# Patient Record
Sex: FEMALE | Race: WHITE | NOT HISPANIC OR LATINO | Employment: OTHER | URBAN - NONMETROPOLITAN AREA
[De-identification: names, ages, dates, MRNs, and addresses within clinical notes are randomized per-mention and may not be internally consistent; named-entity substitution may affect disease eponyms.]

---

## 2017-01-17 ENCOUNTER — TELEPHONE (OUTPATIENT)
Dept: MEDICAL GROUP | Facility: PHYSICIAN GROUP | Age: 77
End: 2017-01-17

## 2017-01-17 ENCOUNTER — TELEMEDICINE2 (OUTPATIENT)
Dept: MEDICAL GROUP | Facility: PHYSICIAN GROUP | Age: 77
End: 2017-01-17
Payer: MEDICARE

## 2017-01-17 ENCOUNTER — TELEMEDICINE ORIGINATING SITE VISIT (OUTPATIENT)
Dept: MEDICAL GROUP | Facility: CLINIC | Age: 77
End: 2017-01-17
Payer: MEDICARE

## 2017-01-17 VITALS
WEIGHT: 107 LBS | RESPIRATION RATE: 16 BRPM | SYSTOLIC BLOOD PRESSURE: 128 MMHG | TEMPERATURE: 96.3 F | HEART RATE: 76 BPM | DIASTOLIC BLOOD PRESSURE: 82 MMHG | OXYGEN SATURATION: 93 % | HEIGHT: 68 IN | BODY MASS INDEX: 16.22 KG/M2

## 2017-01-17 DIAGNOSIS — G47.00 INSOMNIA, UNSPECIFIED TYPE: ICD-10-CM

## 2017-01-17 DIAGNOSIS — G47.01 INSOMNIA DUE TO MEDICAL CONDITION: ICD-10-CM

## 2017-01-17 DIAGNOSIS — R52 BURNING PAIN: ICD-10-CM

## 2017-01-17 PROCEDURE — 99213 OFFICE O/P EST LOW 20 MIN: CPT | Mod: GT | Performed by: NURSE PRACTITIONER

## 2017-01-17 RX ORDER — TRAZODONE HYDROCHLORIDE 50 MG/1
100 TABLET ORAL
Qty: 60 TAB | Refills: 3 | Status: SHIPPED | OUTPATIENT
Start: 2017-01-17 | End: 2017-04-10

## 2017-01-17 RX ORDER — TRAMADOL HYDROCHLORIDE 50 MG/1
50 TABLET ORAL EVERY 4 HOURS PRN
COMMUNITY
End: 2017-04-10

## 2017-01-17 NOTE — MR AVS SNAPSHOT
"Kaleigh Tucker   2017 10:20 AM   Telemedicine2   MRN: [id number]    Department:  [facility]   Dept Phone:  [phone]    Description:  Female : 1940   Provider:  GENEVIEVE Philippe; TELEMED TONOPA           Reason for Visit     Insomnia           Allergies as of 2017     No Known Allergies      You were diagnosed with     Insomnia due to medical condition   [896088]       Burning pain   [407303]         Vital Signs     Blood Pressure Pulse Temperature Respirations Height Weight    128/82 mmHg 76 35.7 °C (96.3 °F) 16 1.727 m (5' 8\") 48.535 kg (107 lb)    Body Mass Index Oxygen Saturation Smoking Status             16.27 kg/m2 93% Never Smoker          Basic Information     Date Of Birth Sex Race Ethnicity Preferred Language    1940 Female White Non- English      Your appointments     Apr 10, 2017  9:00 AM   Follow Up Visit with Jose Landin M.D.   [facility] (--)    [address] [place] [address], [address]  [facility] [address] [phone]           You will be receiving a confirmation call a few days before your appointment from our automated call confirmation system.              Problem List              ICD-10-CM Priority Class Noted - Resolved    Kidney stone N20.0   2015 - Present    Parkinson's disease (CMS-HCC) G20   2015 - Present    Hyponatremia E87.1   2015 - Present    Burning pain R52   2016 - Present    Subcutaneous cyst L72.9   2016 - Present    Insomnia due to medical condition G47.01   2017 - Present      Health Maintenance        Date Due Completion Dates    IMM DTaP/Tdap/Td Vaccine (1 - Tdap) 10/22/1959 ---    PAP SMEAR 10/22/1961 ---    MAMMOGRAM 10/22/1980 ---    COLONOSCOPY 10/22/1990 ---    IMM ZOSTER VACCINE 10/22/2000 ---    BONE DENSITY 10/22/2005 ---    IMM PNEUMOCOCCAL 65+ (ADULT) LOW/MEDIUM RISK SERIES (1 of 2 - PCV13) 10/22/2005 ---    IMM INFLUENZA (1) 2016 ---            Current Immunizations    "    No immunizations on file.      Below and/or attached are the medications your provider expects you to take. Review all of your home medications and newly ordered medications with your provider and/or pharmacist. Follow medication instructions as directed by your provider and/or pharmacist. Please keep your medication list with you and share with your provider. Update the information when medications are discontinued, doses are changed, or new medications (including over-the-counter products) are added; and carry medication information at all times in the event of emergency situations     Allergies:  No Known Allergies          Medications  Valid as of: January 17, 2017 - 10:47 AM    Generic Name Brand Name Tablet Size Instructions for use    ALPRAZolam (Tab) XANAX 0.25 MG Take 0.25 mg by mouth at bedtime as needed for Sleep.        Carbidopa-Levodopa (Tab) SINEMET  MG Take 2 tab at 6AM, 12PM and 6PM, and 1 tab at 9AM and 3PM        Cholecalciferol   Take  by mouth.        Gabapentin (Cap) NEURONTIN 100 MG Take one capsule at night.        ROPINIRole HCl (Tab) REQUIP 1 MG Take 1 Tab by mouth 3 times a day. @ 0600, 1400, and 1800        ROPINIRole HCl (Tab) REQUIP 1 MG Take 1 Tab by mouth 3 times a day. @ 0600, 1400, and 1800        TraMADol HCl   Take  by mouth.        TraMADol HCl (Tab) ULTRAM 50 MG Take 50 mg by mouth every four hours as needed. Take one tablet by mouth twice daily as needed for pain        Trihexyphenidyl HCl (Tab) ARTANE 2 MG Take 1 Tab by mouth 4 times a day. @ 6AM, 9AM, 12PM and 3PM        .                 Medicines prescribed today were sent to:     [id number] - [place], [place] - [address]    [address] [address]    Phone: [phone] Fax: [phone]    Open 24 Hours?: No      Medication refill instructions:       If your prescription bottle indicates you have medication refills left, it is not necessary to call your provider’s office. Please contact  your pharmacy and they will refill your medication.    If your prescription bottle indicates you do not have any refills left, you may request refills at any time through one of the following ways: The online [employer] system (except Urgent Care), by calling your provider’s office, or by asking your pharmacy to contact your provider’s office with a refill request. Medication refills are processed only during regular business hours and may not be available until the next business day. Your provider may request additional information or to have a follow-up visit with you prior to refilling your medication.   *Please Note: Medication refills are assigned a new Rx number when refilled electronically. Your pharmacy may indicate that no refills were authorized even though a new prescription for the same medication is available at the pharmacy. Please request the medicine by name with the pharmacy before contacting your provider for a refill.           [employer] Access Code: Activation code not generated  Current [employer] Status: Active

## 2017-01-17 NOTE — PROGRESS NOTES
Chief Complaint   Patient presents with   • Insomnia       HISTORY OF PRESENT ILLNESS: Patient is a 76 y.o. female BRIELLE , patient, who presents today to discuss:  Insomnia, burning pain and review her labs.  Verified Identification with patient.  Secured video conference with RN presenter in [place], [place]        Insomnia due to medical condition  Patient reports a year of insomnia. Patient states that she normally takes Xanax for sleep, but is no longer helping. Patient has tried. Lavendar. Melatonin and over-the-counter diphenyl hydramine. She continues to have poor sleep and is not feeling restored in the morning. I would like to try her on trazodone at night, but will again get email to Dr. Noble for opinion.    Burning pain  This is a 76-year-old female who complains of chronic burning pain. This pain is so bad that when she has anything covering her body whether it is a blanket or if she or clothing it causes her severe pain. We attempted to try her on gabapentin which was basically successful but caused her to have severe depression and I discontinued it. I would like to pursue this burning pain and possibly start her on Cymbalta but will notify her neurologist if this is at all possible considering she had such a bad outcome with Neurontin.        Allergies:Review of patient's allergies indicates no known allergies.    Current Outpatient Prescriptions Ordered in [facility]   Medication Sig Dispense Refill   • tramadol (ULTRAM) 50 MG Tab Take 50 mg by mouth every four hours as needed. Take one tablet by mouth twice daily as needed for pain     • ropinirole (REQUIP) 1 MG Tab Take 1 Tab by mouth 3 times a day. @ 0600, 1400, and 1800 (Patient taking differently: Take 1 mg by mouth 3 times a day. @ 0600, 1200, and 1800) 270 Tab 3   • ropinirole (REQUIP) 1 MG Tab Take 1 Tab by mouth 3 times a day. @ 0600, 1400, and 1800 270 Tab 3   • carbidopa-levodopa (SINEMET)  MG Tab Take 2 tab at 6AM, 12PM and 6PM, and 1  "tab at 9AM and 3PM (Patient taking differently: Take two tablets orally at 6am, 12pm, and 6pm. Take one tablet at 9am and 3pm.) 720 Tab 3   • trihexyphenidyl (ARTANE) 2 MG Tab Take 1 Tab by mouth 4 times a day. @ 6AM, 9AM, 12PM and 3PM 360 Tab 3   • gabapentin (NEURONTIN) 100 MG Cap Take one capsule at night. 90 Cap 0   • TRAMADOL HCL PO Take  by mouth.     • alprazolam (XANAX) 0.25 MG Tab Take 0.25 mg by mouth at bedtime as needed for Sleep.     • Cholecalciferol (VITAMIN D-3 PO) Take  by mouth.       No current Epic-ordered facility-administered medications on file.       Past Medical History   Diagnosis Date   • Cancer (CMS-HCC)      melanoma on back, surgically removed in . Basal cell on scalp removed    • Muscle disorder      Parkinsons   • Parkinson's disease (CMS-HCC)    • Kidney stone 2015       Social History   Substance Use Topics   • Smoking status: Never Smoker    • Smokeless tobacco: Never Used   • Alcohol Use: No       Family Status   Relation Status Death Age   • Mother     • Father     • Sister Alive    • Brother     • Maternal Aunt     • Maternal Grandmother     • Maternal Grandfather     • Paternal Grandmother     • Paternal Grandfather       Family History   Problem Relation Age of Onset   • Heart Disease Mother      Heart attack/   • Hypertension Mother    • Hyperlipidemia Sister    • Heart Disease Brother    • Cancer Maternal Aunt      stomach cancer/   • Hypertension Maternal Aunt    • Hypertension Maternal Uncle    • Heart Disease Maternal Grandmother    • Cancer Maternal Grandfather      throat cancer       ROS: As documented in my HPI      Exam:  Blood pressure 128/82, pulse 76, temperature 35.7 °C (96.3 °F), resp. rate 16, height 1.727 m (5' 8\"), weight 48.535 kg (107 lb), SpO2 93 %.  General: Thin, pale and wheelchair with shaking upper extremities  Head: No lesions  Neck: Supple.  Pulmonary:  " Normal effort.   Cardiovascular: Regular rate   Psych: Alert and oriented x3. Normal mood and affect.       Please note that this dictation was created using voice recognition software. I have made every reasonable attempt to correct obvious errors, but I expect that there are errors of grammar and possibly content that I did not discover before finalizing the note.    Assessment/Plan:  1. Insomnia due to medical condition   my preference is to start her on trazodone 50 mg or more at night but will consult with her neurologist.    2. Burning pain   patient failed on Neurontin due to psychological symptoms would like to consider Cymbalta for her burning neuropathy pain will consult neurology. ,     Labs reviewed within normal limits.

## 2017-01-17 NOTE — ASSESSMENT & PLAN NOTE
Patient reports a year of insomnia. Patient states that she normally takes Xanax for sleep, but is no longer helping. Patient has tried. Lavendar. Melatonin and over-the-counter diphenyl hydramine. She continues to have poor sleep and is not feeling restored in the morning. I would like to try her on trazodone at night, but will again get email to Dr. Noble for opinion.

## 2017-01-17 NOTE — ASSESSMENT & PLAN NOTE
This is a 76-year-old female who complains of chronic burning pain. This pain is so bad that when she has anything covering her body whether it is a blanket or if she or clothing it causes her severe pain. We attempted to try her on gabapentin which was basically successful but caused her to have severe depression and I discontinued it. I would like to pursue this burning pain and possibly start her on Cymbalta but will notify her neurologist if this is at all possible considering she had such a bad outcome with Neurontin.

## 2017-01-18 NOTE — TELEPHONE ENCOUNTER
Received email from Dr. Landin. I will start patient on Trazadone 50 mg one at night. Once she is accustomed to the medication with no side effects I will then introduce Cymbalta. Please let her know that I am going to call in one prescription of trazodone 50 mg she can take one or 2 at night for sleep.

## 2017-01-21 ENCOUNTER — PATIENT MESSAGE (OUTPATIENT)
Dept: MEDICAL GROUP | Facility: PHYSICIAN GROUP | Age: 77
End: 2017-01-21

## 2017-01-24 ENCOUNTER — PATIENT MESSAGE (OUTPATIENT)
Dept: MEDICAL GROUP | Facility: PHYSICIAN GROUP | Age: 77
End: 2017-01-24

## 2017-01-24 RX ORDER — DULOXETIN HYDROCHLORIDE 20 MG/1
20 CAPSULE, DELAYED RELEASE ORAL DAILY
Qty: 30 CAP | Refills: 3 | Status: SHIPPED | OUTPATIENT
Start: 2017-01-24 | End: 2017-04-10

## 2017-01-25 NOTE — TELEPHONE ENCOUNTER
From: Kaleigh Sims  To: GENEVIEVE Philippe  Sent: 1/24/2017 4:13 PM PST  Subject: Prescription Question    I just read about it. How does the FDA approve anything with such side effects? I'm curious about the fact that I already have some of the side effects, such as blurry vision.   Will you call it in today?    ----- Message -----  From: GENEVIEVE Philippe  Sent: 1/24/17, 3:30 PM  To: Kaleigh Sims  Subject: RE: Prescription Question    Okay then. I can prescribe Cymbalta for your burning pain.   Jackie Villalba      ----- Message -----   From: KALEIGH SIMS   Sent: 1/21/2017 3:14 PM PST   To: GENEVIEVE Philippe  Subject: Prescription Question    Jackie, because of my experience with gabapentn, I'm not going to take the trazadone. The patient instructions didn't etate what the med was for so I looked it up. Off label for insomnia, with side effects of blurry vision and burning skin. I already have those problems so taking a med that could give me more is not ideal.  Is there another med you were going to prescribe for the burning skin? If I don't have the burning skin, I can sleep.   Thank you!  Kaleigh

## 2017-01-26 ENCOUNTER — TELEPHONE (OUTPATIENT)
Dept: NEUROLOGY | Facility: MEDICAL CENTER | Age: 77
End: 2017-01-26

## 2017-01-26 ENCOUNTER — PATIENT MESSAGE (OUTPATIENT)
Dept: MEDICAL GROUP | Facility: PHYSICIAN GROUP | Age: 77
End: 2017-01-26

## 2017-01-26 NOTE — TELEPHONE ENCOUNTER
From: Kaleigh Sims  To: GENEVIEVE Philippe  Sent: 1/26/2017 9:31 AM PST  Subject: Prescription Question    One night doesn't predict the future but it does provide hope. I took a Cymbalta at 4pm. I took my usual 1 tramadol and 2 0.25 Xanax at 9pm. I settled in bed and the next thing I noticed was that I had to go to the bathroom. It was 3:30am -- that's the longest uninterrupted stretch of sleep in more than a month. When I did get up, I had no burning skin. I am still free of pain at 9:30am. I am a bit wobbly but I think my body will adjust to that. I just wanted to share the situation.   Thank you  ----- Message -----  From: GENEVIEVE Philippe  Sent: 1/24/2017 4:15 PM PST  To: Kaleigh Sims  Subject: RE: Prescription Question    It is hard to imagine that these medications are helpful with all the side effects.  I am out of ideas for medications.  Cymbalta has been a god send for people with neuropathy that feels like hot lava on their skin.  You let me know. I already called it in.  Jackie Villalba      ----- Message -----   From: KALEIGH SIMS   Sent: 1/24/2017 4:13 PM PST   To: GENEVIEVE Philippe  Subject: Prescription Question    I just read about it. How does the FDA approve anything with such side effects? I'm curious about the fact that I already have some of the side effects, such as blurry vision.   Will you call it in today?    ----- Message -----  From: GENEVIEVE Philippe  Sent: 1/24/17, 3:30 PM  To: Kaleigh Sims  Subject: RE: Prescription Question    Okay then. I can prescribe Cymbalta for your burning pain.   Jackie Villalba      ----- Message -----   From: KALEIGH SIMS   Sent: 1/21/2017 3:14 PM PST   To: GENEVIEVE Philippe  Subject: Prescription Question    Jackie, because of my experience with gabapentn, I'm not going to take the trazadone. The patient instructions didn't etate what the med was for so I looked it up. Off label for insomnia, with side effects of blurry vision and burning skin. I already  have those problems so taking a med that could give me more is not ideal.  Is there another med you were going to prescribe for the burning skin? If I don't have the burning skin, I can sleep.   Thank you!  Kaleigh

## 2017-01-26 NOTE — TELEPHONE ENCOUNTER
Pt is sending this message via [employer] e-mail. Please advise. Thank you. KA    An update. Since the cold weather, I have averaged about 4 hours of interrupted sleep, mainly due to the burning skin sensation. Jackie Villalba, via teleconferencing between Maritza and Zeny Montano, prescribed trazadone. Because of my experience with gabapentin, I didn't want to take it. She prescribed 20mg Cymbalta. I took one at 4pm and my usual night meds.I slept 6 hours! My skin is not burning today, but balance is not good. I will keep you posted.

## 2017-02-01 ENCOUNTER — PATIENT MESSAGE (OUTPATIENT)
Dept: NEUROLOGY | Facility: MEDICAL CENTER | Age: 77
End: 2017-02-01

## 2017-02-01 ENCOUNTER — PATIENT MESSAGE (OUTPATIENT)
Dept: MEDICAL GROUP | Facility: PHYSICIAN GROUP | Age: 77
End: 2017-02-01

## 2017-02-01 NOTE — TELEPHONE ENCOUNTER
From: Steve Sims  To: Deepa Dobson, Med Ass't  Sent: 2/1/2017 9:35 AM PST  Subject: Prescription Question    Deepa, thank you! I can wait until I see Dr Landin in April. I will need the prescription only if I am audited.   I hope you are doing well.  Steve  ----- Message -----  From: Deepa SILVIAKamille Bronson, Med Ass't  Sent: 2/1/2017 9:14 AM PST  To: Steve Sims  Subject: RE: Prescription Question    Good morning Steve. You are more than welcome to ask Dr. Landin at your appointment for the Rx. You can also ask your PCP and see if she can give it to you sooner. Dr. Landin is out of the office for a couple more weeks. Thank you. Deepa MCDOWELL for Dr. Landin.     ----- Message -----   From: STEVE SIMS   Sent: 2/1/2017 9:09 AM PST   To: Jose Landin M.D.  Subject: Prescription Question    Deepa, sort of off-topic. My physical therapist recommended safety rails in my house. I had them installed. I found out yesterday that I can deduct the cost if I have a written prescription from my doctor. Can I get such? I have an appointment with Dr Landin on April 10, so I could get it then, if so. Thank you, Steve  PS: The Cymbalta allows me to get enough sleep! Yeah!

## 2017-02-01 NOTE — TELEPHONE ENCOUNTER
From: Kaleigh Sims  To: GENEVIEVE Philippe  Sent: 2/1/2017 9:02 AM PST  Subject: Prescription Question    The Cymbalta is allowing me to sleep. The burning skin is with me during the day, e.g., right now. But sleep is more important. I read that the med interacts with tramadol, but that some doctors prescribe both meds.To be on the safe side, I am not taking the tramadol at night time. Your opinion? What about an NSAID? Aleve? I hate taking meds of any kind but I decided I dislike pain.  Kaleigh  ----- Message -----  From: GENEVIEVE Philippe  Sent: 1/26/2017 9:59 AM PST  To: Kaleigh Sims  Subject: RE: Prescription Question    I am happy you had a good stretch of sleep. Let's see how it goes !  Jackie    ----- Message -----   From: KALEIGH SIMS   Sent: 1/26/2017 9:31 AM PST   To: GENEVIEVE Philippe  Subject: Prescription Question    One night doesn't predict the future but it does provide hope. I took a Cymbalta at 4pm. I took my usual 1 tramadol and 2 0.25 Xanax at 9pm. I settled in bed and the next thing I noticed was that I had to go to the bathroom. It was 3:30am -- that's the longest uninterrupted stretch of sleep in more than a month. When I did get up, I had no burning skin. I am still free of pain at 9:30am. I am a bit wobbly but I think my body will adjust to that. I just wanted to share the situation.   Thank you  ----- Message -----  From: GENEVIEVE Philippe  Sent: 1/24/2017 4:15 PM PST  To: Kaleigh Sims  Subject: RE: Prescription Question    It is hard to imagine that these medications are helpful with all the side effects.  I am out of ideas for medications.  Cymbalta has been a god send for people with neuropathy that feels like hot lava on their skin.  You let me know. I already called it in.  Jackie Villalba      ----- Message -----   From: KALEIGH SIMS   Sent: 1/24/2017 4:13 PM PST   To: GENEVIEVE Philippe  Subject: Prescription Question    I just read about it. How does the FDA approve anything with such  side effects? I'm curious about the fact that I already have some of the side effects, such as blurry vision.   Will you call it in today?    ----- Message -----  From: GENEVIEVE Philippe  Sent: 1/24/17, 3:30 PM  To: Kaleigh Sims  Subject: RE: Prescription Question    Okay then. I can prescribe Cymbalta for your burning pain.   Jackie Villalba      ----- Message -----   From: KALEIGH SIMS   Sent: 1/21/2017 3:14 PM PST   To: GENEVIEVE Philippe  Subject: Prescription Question    Jackie, because of my experience with gabapentn, I'm not going to take the trazadone. The patient instructions didn't etate what the med was for so I looked it up. Off label for insomnia, with side effects of blurry vision and burning skin. I already have those problems so taking a med that could give me more is not ideal.  Is there another med you were going to prescribe for the burning skin? If I don't have the burning skin, I can sleep.   Thank you!  Kaleigh

## 2017-02-08 ENCOUNTER — PATIENT MESSAGE (OUTPATIENT)
Dept: MEDICAL GROUP | Facility: PHYSICIAN GROUP | Age: 77
End: 2017-02-08

## 2017-02-08 DIAGNOSIS — M25.569 KNEE PAIN, UNSPECIFIED CHRONICITY, UNSPECIFIED LATERALITY: ICD-10-CM

## 2017-02-08 DIAGNOSIS — Z96.659 HISTORY OF KNEE REPLACEMENT, UNSPECIFIED LATERALITY: ICD-10-CM

## 2017-02-08 DIAGNOSIS — G20.A1 PARKINSON'S DISEASE: ICD-10-CM

## 2017-02-08 NOTE — TELEPHONE ENCOUNTER
From: Kaleigh Sims  To: GENEVIEVE Philippe  Sent: 2/8/2017 10:15 AM PST  Subject: Prescription Question    Problems with Duloxetine (Cymbalta)     VERY BAD negative side effects(?) occur in the evenings, after 4pm when I take the Duloxetine. Unable to do anything — extreme weakness, negate effects of PD meds I take a 2:30pm and 5:30pm; legs are extremely weak; “shiver” but not cold when in bed. Wake during the night; headache (I never get them); discomfort but no burning skin.  No get up and go. Don’t feel like working on my projects. Thinking is muddy. Blurry vision from PD meds seems worse  ADVICE?  ----- Message -----  From: GENEVIEVE Philippe  Sent: 2/1/2017 9:44 AM PST  To: Kaleigh Sims  Subject: RE: Prescription Question    This depends on the timing. I do prescribed both medications.  Quality of life is the important issue.  Watch for problems with balance and urine retention - as general rule.  Taking NSAID would be okay if you would like to replace.  Jackie Villalba      ----- Message -----   From: KALEIGH SIMS   Sent: 2/1/2017 9:02 AM PST   To: GENEVIEVE Philippe  Subject: Prescription Question    The Cymbalta is allowing me to sleep. The burning skin is with me during the day, e.g., right now. But sleep is more important. I read that the med interacts with tramadol, but that some doctors prescribe both meds.To be on the safe side, I am not taking the tramadol at night time. Your opinion? What about an NSAID? Aleve? I hate taking meds of any kind but I decided I dislike pain.  Kaleigh  ----- Message -----  From: GENEVIEVE Philippe  Sent: 1/26/2017 9:59 AM PST  To: Kaleigh Sims  Subject: RE: Prescription Question    I am happy you had a good stretch of sleep. Let's see how it goes !  Jackie    ----- Message -----   From: KALEIGH SIMS   Sent: 1/26/2017 9:31 AM PST   To: GENEVIEVE Philippe  Subject: Prescription Question    One night doesn't predict the future but it does provide hope. I took a Cymbalta at 4pm. I  took my usual 1 tramadol and 2 0.25 Xanax at 9pm. I settled in bed and the next thing I noticed was that I had to go to the bathroom. It was 3:30am -- that's the longest uninterrupted stretch of sleep in more than a month. When I did get up, I had no burning skin. I am still free of pain at 9:30am. I am a bit wobbly but I think my body will adjust to that. I just wanted to share the situation.   Thank you  ----- Message -----  From: GENEVIEVE Philippe  Sent: 1/24/2017 4:15 PM PST  To: Kaleigh Sims  Subject: RE: Prescription Question    It is hard to imagine that these medications are helpful with all the side effects.  I am out of ideas for medications.  Cymbalta has been a god send for people with neuropathy that feels like hot lava on their skin.  You let me know. I already called it in.  Jackie Villalba      ----- Message -----   From: KALEIGH SIMS   Sent: 1/24/2017 4:13 PM PST   To: GENEVIEVE Philippe  Subject: Prescription Question    I just read about it. How does the FDA approve anything with such side effects? I'm curious about the fact that I already have some of the side effects, such as blurry vision.   Will you call it in today?    ----- Message -----  From: GENEVIEVE Philippe  Sent: 1/24/17, 3:30 PM  To: Kaleigh Sims  Subject: RE: Prescription Question    Okay then. I can prescribe Cymbalta for your burning pain.   Jackie Villalba      ----- Message -----   From: KALEIGH SIMS   Sent: 1/21/2017 3:14 PM PST   To: GENEVIEVE Philippe  Subject: Prescription Question    Jackie, because of my experience with gabapentn, I'm not going to take the trazadone. The patient instructions didn't etate what the med was for so I looked it up. Off label for insomnia, with side effects of blurry vision and burning skin. I already have those problems so taking a med that could give me more is not ideal.  Is there another med you were going to prescribe for the burning skin? If I don't have the burning skin, I can sleep.   Thank  you!  Kaleigh

## 2017-02-08 NOTE — TELEPHONE ENCOUNTER
From: Kaleigh Sims  To: GENEVIEVE Philippe  Sent: 2/8/2017 12:09 PM PST  Subject: Prescription Question    By tapering the Cymbalta, do you mean to take it every other day? It's a capsule that I can't break. Thank you!    ----- Message -----  From: GENEVIEVE Philippe  Sent: 2/8/17, 11:48 AM  To: Kaleigh Sims  Subject: RE: Prescription Question    No sure these are side effects as it is delayed. Or is it something else.  You can taper the cymbalta and see if it goes away. If it does, there is your answer, if not, then it is something else.  Jackie Villalba      ----- Message -----   From: KALEIGH SIMS   Sent: 2/8/2017 10:15 AM PST   To: GENEVIEVE Philippe  Subject: Prescription Question    Problems with Duloxetine (Cymbalta)     VERY BAD negative side effects(?) occur in the evenings, after 4pm when I take the Duloxetine. Unable to do anything — extreme weakness, negate effects of PD meds I take a 2:30pm and 5:30pm; legs are extremely weak; “shiver” but not cold when in bed. Wake during the night; headache (I never get them); discomfort but no burning skin.  No get up and go. Don’t feel like working on my projects. Thinking is muddy. Blurry vision from PD meds seems worse  ADVICE?  ----- Message -----  From: GENEVIEVE Philippe  Sent: 2/1/2017 9:44 AM PST  To: Kaleigh Sims  Subject: RE: Prescription Question    This depends on the timing. I do prescribed both medications.  Quality of life is the important issue.  Watch for problems with balance and urine retention - as general rule.  Taking NSAID would be okay if you would like to replace.  Jackie Villalba      ----- Message -----   From: KALEIGH SIMS   Sent: 2/1/2017 9:02 AM PST   To: GENEVIEVE Philippe  Subject: Prescription Question    The Cymbalta is allowing me to sleep. The burning skin is with me during the day, e.g., right now. But sleep is more important. I read that the med interacts with tramadol, but that some doctors prescribe both meds.To be on the safe  side, I am not taking the tramadol at night time. Your opinion? What about an NSAID? Aleve? I hate taking meds of any kind but I decided I dislike pain.  Kaleigh  ----- Message -----  From: GENEVIEVE Philippe  Sent: 1/26/2017 9:59 AM PST  To: Kaleigh Sims  Subject: RE: Prescription Question    I am happy you had a good stretch of sleep. Let's see how it goes !  Jackie    ----- Message -----   From: KALEIGH SIMS   Sent: 1/26/2017 9:31 AM PST   To: GENEVIEVE Philippe  Subject: Prescription Question    One night doesn't predict the future but it does provide hope. I took a Cymbalta at 4pm. I took my usual 1 tramadol and 2 0.25 Xanax at 9pm. I settled in bed and the next thing I noticed was that I had to go to the bathroom. It was 3:30am -- that's the longest uninterrupted stretch of sleep in more than a month. When I did get up, I had no burning skin. I am still free of pain at 9:30am. I am a bit wobbly but I think my body will adjust to that. I just wanted to share the situation.   Thank you  ----- Message -----  From: GENEVIEVE Philippe  Sent: 1/24/2017 4:15 PM PST  To: Kaleigh Sims  Subject: RE: Prescription Question    It is hard to imagine that these medications are helpful with all the side effects.  I am out of ideas for medications.  Cymbalta has been a god send for people with neuropathy that feels like hot lava on their skin.  You let me know. I already called it in.  Jackie Villalba      ----- Message -----   From: KALEIGH SIMS   Sent: 1/24/2017 4:13 PM PST   To: GENEVIEVE Philippe  Subject: Prescription Question    I just read about it. How does the FDA approve anything with such side effects? I'm curious about the fact that I already have some of the side effects, such as blurry vision.   Will you call it in today?    ----- Message -----  From: GENEVIEVE Philippe  Sent: 1/24/17, 3:30 PM  To: Kaleigh Sims  Subject: RE: Prescription Question    Okay then. I can prescribe Cymbalta for your burning pain.   Jackie KHAN  Deejay      ----- Message -----   From: STEVE SIMS   Sent: 1/21/2017 3:14 PM PST   To: Jackie Villalba, GENEVIEVE  Subject: Prescription Question    Jackie, because of my experience with gabapentn, I'm not going to take the trazadone. The patient instructions didn't etate what the med was for so I looked it up. Off label for insomnia, with side effects of blurry vision and burning skin. I already have those problems so taking a med that could give me more is not ideal.  Is there another med you were going to prescribe for the burning skin? If I don't have the burning skin, I can sleep.   Thank you!  Steve

## 2017-02-09 PROBLEM — M25.569 KNEE PAIN: Status: ACTIVE | Noted: 2017-02-09

## 2017-02-14 ENCOUNTER — PATIENT MESSAGE (OUTPATIENT)
Dept: MEDICAL GROUP | Facility: PHYSICIAN GROUP | Age: 77
End: 2017-02-14

## 2017-02-14 NOTE — TELEPHONE ENCOUNTER
From: Steve Sims  To: GENEVIEVE Philippe  Sent: 2/14/2017 6:46 AM PST  Subject: Prescription Question    I've been taking it every 2-3 days. The effects are noticeable not only to me but when my son comes to cook dinner, he can tell if I have taken it. I am not sleeping any better, still getting about 4 hours of interrupted sleep. The burning skin is with me day and night.  I will see if my knee pain is related.          ----- Message -----  From: GENEVIEVE Philippe  Sent: 2/8/17, 1:00 PM  To: Steve Sims  Subject: RE: Prescription Question    Yes, I think that will work well.  Jackie Villalba      ----- Message -----   From: STEVE SIMS   Sent: 2/8/2017 12:09 PM PST   To: GENEVIEVE Philippe  Subject: Prescription Question    By tapering the Cymbalta, do you mean to take it every other day? It's a capsule that I can't break. Thank you!    ----- Message -----  From: GENEVIEVE Philippe  Sent: 2/8/17, 11:48 AM  To: Steve Sims  Subject: RE: Prescription Question    No sure these are side effects as it is delayed. Or is it something else.  You can taper the cymbalta and see if it goes away. If it does, there is your answer, if not, then it is something else.  Jackie Villalba      ----- Message -----   From: STEVE SIMS   Sent: 2/8/2017 10:15 AM PST   To: GENEVIEVE Philippe  Subject: Prescription Question    Problems with Duloxetine (Cymbalta)     VERY BAD negative side effects(?) occur in the evenings, after 4pm when I take the Duloxetine. Unable to do anything — extreme weakness, negate effects of PD meds I take a 2:30pm and 5:30pm; legs are extremely weak; “shiver” but not cold when in bed. Wake during the night; headache (I never get them); discomfort but no burning skin.  No get up and go. Don’t feel like working on my projects. Thinking is muddy. Blurry vision from PD meds seems worse  ADVICE?  ----- Message -----  From: MALLY PhilippePRUSSELL  Sent: 2/1/2017 9:44 AM PST  To: Steve Sims  Subject: RE: Prescription  Question    This depends on the timing. I do prescribed both medications.  Quality of life is the important issue.  Watch for problems with balance and urine retention - as general rule.  Taking NSAID would be okay if you would like to replace.  Jackie Villalba      ----- Message -----   From: KALEIGH SIMS   Sent: 2/1/2017 9:02 AM PST   To: GENEVIEVE Philippe  Subject: Prescription Question    The Cymbalta is allowing me to sleep. The burning skin is with me during the day, e.g., right now. But sleep is more important. I read that the med interacts with tramadol, but that some doctors prescribe both meds.To be on the safe side, I am not taking the tramadol at night time. Your opinion? What about an NSAID? Aleve? I hate taking meds of any kind but I decided I dislike pain.  Kaleigh  ----- Message -----  From: GENEVIEVE Philippe  Sent: 1/26/2017 9:59 AM PST  To: Kaleigh Sims  Subject: RE: Prescription Question    I am happy you had a good stretch of sleep. Let's see how it goes !  Jackie    ----- Message -----   From: KALEIGH SIMS   Sent: 1/26/2017 9:31 AM PST   To: GENEVIEVE Philippe  Subject: Prescription Question    One night doesn't predict the future but it does provide hope. I took a Cymbalta at 4pm. I took my usual 1 tramadol and 2 0.25 Xanax at 9pm. I settled in bed and the next thing I noticed was that I had to go to the bathroom. It was 3:30am -- that's the longest uninterrupted stretch of sleep in more than a month. When I did get up, I had no burning skin. I am still free of pain at 9:30am. I am a bit wobbly but I think my body will adjust to that. I just wanted to share the situation.   Thank you  ----- Message -----  From: GENEVIEVE Philippe  Sent: 1/24/2017 4:15 PM PST  To: Kaleigh Sims  Subject: RE: Prescription Question    It is hard to imagine that these medications are helpful with all the side effects.  I am out of ideas for medications.  Cymbalta has been a god send for people with neuropathy that feels  like hot lava on their skin.  You let me know. I already called it in.  Jackie Villalba      ----- Message -----   From: KALEIGH SIMS   Sent: 1/24/2017 4:13 PM PST   To: GENEVIEVE Philippe  Subject: Prescription Question    I just read about it. How does the FDA approve anything with such side effects? I'm curious about the fact that I already have some of the side effects, such as blurry vision.   Will you call it in today?    ----- Message -----  From: GENEVIEVE Philippe  Sent: 1/24/17, 3:30 PM  To: Kaleigh Sims  Subject: RE: Prescription Question    Okay then. I can prescribe Cymbalta for your burning pain.   Jackie Villalba      ----- Message -----   From: KALEIGH SIMS   Sent: 1/21/2017 3:14 PM PST   To: GENEVIEVE Philippe  Subject: Prescription Question    Jackie, because of my experience with gabapentn, I'm not going to take the trazadone. The patient instructions didn't etate what the med was for so I looked it up. Off label for insomnia, with side effects of blurry vision and burning skin. I already have those problems so taking a med that could give me more is not ideal.  Is there another med you were going to prescribe for the burning skin? If I don't have the burning skin, I can sleep.   Thank you!  Kaleigh

## 2017-03-01 ENCOUNTER — PATIENT MESSAGE (OUTPATIENT)
Dept: NEUROLOGY | Facility: MEDICAL CENTER | Age: 77
End: 2017-03-01

## 2017-03-02 ENCOUNTER — TELEPHONE (OUTPATIENT)
Dept: NEUROLOGY | Facility: MEDICAL CENTER | Age: 77
End: 2017-03-02

## 2017-03-02 ENCOUNTER — APPOINTMENT (OUTPATIENT)
Dept: RADIOLOGY | Facility: IMAGING CENTER | Age: 77
End: 2017-03-02
Attending: NURSE PRACTITIONER
Payer: MEDICARE

## 2017-03-02 DIAGNOSIS — M25.562 CHRONIC PAIN OF LEFT KNEE: ICD-10-CM

## 2017-03-02 DIAGNOSIS — G89.29 CHRONIC PAIN OF LEFT KNEE: ICD-10-CM

## 2017-03-02 DIAGNOSIS — Z96.652 HISTORY OF LEFT KNEE REPLACEMENT: ICD-10-CM

## 2017-03-02 PROCEDURE — 73562 X-RAY EXAM OF KNEE 3: CPT | Mod: TC,LT | Performed by: NURSE PRACTITIONER

## 2017-03-03 ENCOUNTER — PATIENT MESSAGE (OUTPATIENT)
Dept: MEDICAL GROUP | Facility: PHYSICIAN GROUP | Age: 77
End: 2017-03-03

## 2017-03-03 DIAGNOSIS — M25.562 CHRONIC PAIN OF LEFT KNEE: ICD-10-CM

## 2017-03-03 DIAGNOSIS — Z96.652 HISTORY OF LEFT KNEE REPLACEMENT: ICD-10-CM

## 2017-03-03 DIAGNOSIS — G89.29 CHRONIC PAIN OF LEFT KNEE: ICD-10-CM

## 2017-03-03 NOTE — TELEPHONE ENCOUNTER
From: Kaleigh Sims  To: GENEVIEVE Philippe  Sent: 3/3/2017 1:21 PM PST  Subject: Test Result Question    Jackie, would it be done in [place], or where?  ----- Message -----  From: GENEVIEVE Philippe  Sent: 3/3/2017 1:13 PM PST  To: Kaleigh Sims  Subject: RE: Test Result Question    I would order, and you would need to have done . Have you ever had MRI?  You would need to be very still for moments at a time.  Jackie    ----- Message -----   From: KALEIGH SIMS   Sent: 3/3/2017 9:00 AM PST   To: GENEVIEVE Philippe  Subject: Test Result Question    Jackie, I don't understand most of the x-ray report but if I need an MRI or bone scan, I can do that. How do I go about getting one?  Thank you,  Kaleigh

## 2017-03-03 NOTE — TELEPHONE ENCOUNTER
From: Steve Sims  To: GENEVIEVE Philippe  Sent: 3/3/2017 2:33 PM PST  Subject: Test Result Question    [place] is preferable to [place]. I don't know which orthopedic group because the surgeon who did the replacement retired. Keep it within [facility]?  ----- Message -----  From: GENEVIEVE Philippe  Sent: 3/3/2017 2:26 PM PST  To: Steve Sims  Subject: RE: Test Result Question    Okay, I can order in [place], [place]. Where ever you like. What orthopedic group would you go to?    ----- Message -----   From: STEVE SIMS   Sent: 3/3/2017 1:18 PM PST   To: GENEVIEVE Philippe  Subject: Test Result Question    I had an MRI for my brain to check before the diagnosis of Parkinson's. I think I also had one for my knee but I might be mistaken. I would like to get it done because the pain is so dreadful. Do you think I am making it worse by walking outside, on dirt roads with hills? I don't want to do that if I am being foolish  Evans youSteve  ----- Message -----  From: GENEVIEVE Philippe  Sent: 3/3/2017 1:13 PM PST  To: Steve Sims  Subject: RE: Test Result Question    I would order, and you would need to have done . Have you ever had MRI?  You would need to be very still for moments at a time.  Jackie    ----- Message -----   From: STEVE SIMS   Sent: 3/3/2017 9:00 AM PST   To: GENEVIEVE Philippe  Subject: Test Result Question    Jackie, I don't understand most of the x-ray report but if I need an MRI or bone scan, I can do that. How do I go about getting one?  Thank youSteve

## 2017-03-13 ENCOUNTER — HOSPITAL ENCOUNTER (OUTPATIENT)
Dept: RADIOLOGY | Facility: MEDICAL CENTER | Age: 77
End: 2017-03-13
Attending: NURSE PRACTITIONER
Payer: MEDICARE

## 2017-03-13 DIAGNOSIS — G89.29 CHRONIC PAIN OF LEFT KNEE: ICD-10-CM

## 2017-03-13 DIAGNOSIS — Z96.652 HISTORY OF LEFT KNEE REPLACEMENT: ICD-10-CM

## 2017-03-13 DIAGNOSIS — M25.562 CHRONIC PAIN OF LEFT KNEE: ICD-10-CM

## 2017-03-13 PROCEDURE — 73721 MRI JNT OF LWR EXTRE W/O DYE: CPT | Mod: LT

## 2017-03-14 ENCOUNTER — PATIENT MESSAGE (OUTPATIENT)
Dept: MEDICAL GROUP | Facility: PHYSICIAN GROUP | Age: 77
End: 2017-03-14

## 2017-03-14 DIAGNOSIS — M25.562 CHRONIC PAIN OF LEFT KNEE: ICD-10-CM

## 2017-03-14 DIAGNOSIS — Z96.652 HISTORY OF LEFT KNEE REPLACEMENT: ICD-10-CM

## 2017-03-14 DIAGNOSIS — G89.29 CHRONIC PAIN OF LEFT KNEE: ICD-10-CM

## 2017-03-16 ENCOUNTER — PATIENT MESSAGE (OUTPATIENT)
Dept: MEDICAL GROUP | Facility: PHYSICIAN GROUP | Age: 77
End: 2017-03-16

## 2017-03-16 DIAGNOSIS — G20.A1 PARKINSON'S DISEASE: ICD-10-CM

## 2017-03-16 DIAGNOSIS — E63.8 INADEQUATE DIETARY INTAKE OF PROTEIN: ICD-10-CM

## 2017-03-16 NOTE — TELEPHONE ENCOUNTER
From: Steve Sims  To: DIEGO Nagy  Sent: 3/16/2017 2:26 PM PDT  Subject: Non-Urgent Medical Question    Thank you.   I have lost a lot of weight recently. I have Parkinson's disease and can't eat protein when I take sinemet. I take sinemet every 3 hours. It's hard to know what to eat. A dietitian can help me time my food intake so it doesn't interfere with the sinemet.   ----- Message -----  From: DIEGO Nagy  Sent: 3/16/17, 1:57 PM  To: Steve Sims  Subject: RE: Non-Urgent Medical Question    Referral to ortho as placed the same day, we are waiting on [facility] to improve your referral.    As for dietitian and I do not see that this was done. I do need to place this under her diagnoses, for what reason will you be seeing a dietitian?    ----- Message -----   From: STEVE SIMS   Sent: 3/16/2017 12:58 PM PDT   To: DIEOG Nagy  Subject: Non-Urgent Medical Question    Thank you, very much. I got an appointment for April 11.  Jackie was going to make a referral for me to see a dietitian. Do you know if that was done? I could see one on April 10, after my 9am appointment with Dr Landin. Or, on April 11, if the 10th doesn't work. And, after my MRI, she suggested that I see an orthopedic doctor. Does she have to refer me, or do I just call one? Thanks!      ----- Message -----  From: DIEGO Nagy  Sent: 3/16/17, 12:48 PM  To: Steve Sims  Subject: RE: Non-Urgent Medical Question    Referral was sent on 3/14/17     Referral information sent to the following:  Physical Therapy   [facility]    [address]  [place] [address]  [phone]  [phone]      ----- Message -----   From: STEVE SIMS   Sent: 3/16/2017 8:57 AM PDT   To: GENEVIEVE Philippe  Subject: RE: Non-Urgent Medical Question    Jackie, will you let me know when you make the referral for physical therapy so I can make my appointment? I would like to have the first appointment  on the 10th of April, after 11am. I can then make appointments for the 10 sessions of PT, and then make appointments with a dietitian, with the orthopedist for my knee, for new hearing aids, new eyeglasses, a haircut, etc. I will be in [place] until the 6th of May. I'm feeling a time crunch.  Thank you so much.        ----- Message -----  From: GENEVIEVE Philippe  Sent: 3/14/17, 4:04 PM  To: Steve Sims  Subject: RE: Non-Urgent Medical Question    Yes, your MRI is done. Looks stable, although there is some fluid. I will refer to PT.  [facility].  Jackie Villalba      ----- Message -----   From: STEVE SIMS   Sent: 3/14/2017 1:01 PM PDT   To: GENEVIEVE Philippe  Subject: Non-Urgent Medical Question    Jackie, can you order physical therapy for me at [facility] in [place]? Because of my last session with Jarrell Pedroza last October, I can now walk outside. He said I should be walking inside also. Can you send a referral so I can get moving as much and as safely as possible when I'm inside? I'm going to be in [place] starting April 10. If an initial assessment is scheduled for any time after 11am on that day, I can get 10 appointments before I return to [place]. The fax number is [phone].   Will Radiology send you the MRI results? I had it yesterday.   Thank you!  Steve Sims

## 2017-03-16 NOTE — TELEPHONE ENCOUNTER
From: Steve Sims  To: DIEGO Nagy  Sent: 3/16/2017 12:58 PM PDT  Subject: Non-Urgent Medical Question    Thank you, very much. I got an appointment for April 11.  Jackie was going to make a referral for me to see a dietitian. Do you know if that was done? I could see one on April 10, after my 9am appointment with Dr Landin. Or, on April 11, if the 10th doesn't work. And, after my MRI, she suggested that I see an orthopedic doctor. Does she have to refer me, or do I just call one? Thanks!      ----- Message -----  From: DIEGO Nagy  Sent: 3/16/17, 12:48 PM  To: Steve Sims  Subject: RE: Non-Urgent Medical Question    Referral was sent on 3/14/17     Referral information sent to the following:  Physical Therapy   [facility] Therapy    [address]  [place] [address]  [phone]  [phone]      ----- Message -----   From: STEVE SIMS   Sent: 3/16/2017 8:57 AM PDT   To: GENEVIEVE Philippe  Subject: RE: Non-Urgent Medical Question    Jackie, will you let me know when you make the referral for physical therapy so I can make my appointment? I would like to have the first appointment on the 10th of April, after 11am. I can then make appointments for the 10 sessions of PT, and then make appointments with a dietitian, with the orthopedist for my knee, for new hearing aids, new eyeglasses, a haircut, etc. I will be in [place] until the 6th of May. I'm feeling a time crunch.  Thank you so much.        ----- Message -----  From: GENEVIEVE Philippe  Sent: 3/14/17, 4:04 PM  To: Steve Sims  Subject: RE: Non-Urgent Medical Question    Yes, your MRI is done. Looks stable, although there is some fluid. I will refer to PT.  [facility].  Jackie Villalba      ----- Message -----   From: STEVE SIMS   Sent: 3/14/2017 1:01 PM PDT   To: Jackie Villalba, A.P.NKamille  Subject: Non-Urgent Medical Question    Jackie, can you order physical therapy for me at [facility]? Because of my last session with  Jarrell Pedroza last October, I can now walk outside. He said I should be walking inside also. Can you send a referral so I can get moving as much and as safely as possible when I'm inside? I'm going to be in [place] starting April 10. If an initial assessment is scheduled for any time after 11am on that day, I can get 10 appointments before I return to [place]. The fax number is [phone].   Will Radiology send you the MRI results? I had it yesterday.   Thank you!  Kaleigh Tucker

## 2017-03-16 NOTE — TELEPHONE ENCOUNTER
From: Steve Sheikhdy  To: DIEGO Nagy  Sent: 3/16/2017 3:44 PM PDT  Subject: Non-Urgent Medical Question    Dana thank you so much.    ----- Message -----  From: DIEGO Nagy  Sent: 3/16/17, 3:31 PM  To: Steve Garrett  Subject: RE: Non-Urgent Medical Question    I will take care of the referral for you. Thanks for getting back to me.       ----- Message -----   From: STEVE SIMS   Sent: 3/16/2017 2:26 PM PDT   To: DIEGO Nagy  Subject: Non-Urgent Medical Question    Thank you.   I have lost a lot of weight recently. I have Parkinson's disease and can't eat protein when I take sinemet. I take sinemet every 3 hours. It's hard to know what to eat. A dietitian can help me time my food intake so it doesn't interfere with the sinemet.   ----- Message -----  From: DIEGO Nagy  Sent: 3/16/17, 1:57 PM  To: Steve Sims  Subject: RE: Non-Urgent Medical Question    Referral to ortho as placed the same day, we are waiting on [facility] to improve your referral.    As for dietitian and I do not see that this was done. I do need to place this under her diagnoses, for what reason will you be seeing a dietitian?    ----- Message -----   From: STEVE SIMS   Sent: 3/16/2017 12:58 PM PDT   To: DIEGO Nagy  Subject: Non-Urgent Medical Question    Thank you, very much. I got an appointment for April 11.  Jackie was going to make a referral for me to see a dietitian. Do you know if that was done? I could see one on April 10, after my 9am appointment with Dr Landin. Or, on April 11, if the 10th doesn't work. And, after my MRI, she suggested that I see an orthopedic doctor. Does she have to refer me, or do I just call one? Thanks!      ----- Message -----  From: DIEGO Nagy  Sent: 3/16/17, 12:48 PM  To: Steve Sims  Subject: RE: Non-Urgent Medical Question    Referral was sent on 3/14/17     Referral information sent to the following:  Physical Therapy   [facility]    [address]  [place] [address]  [phone]  [phone]      ----- Message -----   From: STEVE SIMS   Sent: 3/16/2017 8:57 AM PDT   To: GENEVIEVE Philippe  Subject: RE: Non-Urgent Medical Question    Jackie, will you let me know when you make the referral for physical therapy so I can make my appointment? I would like to have the first appointment on the 10th of April, after 11am. I can then make appointments for the 10 sessions of PT, and then make appointments with a dietitian, with the orthopedist for my knee, for new hearing aids, new eyeglasses, a haircut, etc. I will be in [place] until the 6th of May. I'm feeling a time crunch.  Thank you so much.        ----- Message -----  From: GENEVIEVE Philippe  Sent: 3/14/17, 4:04 PM  To: Steve Sims  Subject: RE: Non-Urgent Medical Question    Yes, your MRI is done. Looks stable, although there is some fluid. I will refer to PT.  [facility].  Jackie Villalba      ----- Message -----   From: STEVE SIMS   Sent: 3/14/2017 1:01 PM PDT   To: GENEVIEVE Philippe  Subject: Non-Urgent Medical Question    Jackie, can you order physical therapy for me at [facility] in [place]? Because of my last session with Jarrell Pedroza last October, I can now walk outside. He said I should be walking inside also. Can you send a referral so I can get moving as much and as safely as possible when I'm inside? I'm going to be in [place] starting April 10. If an initial assessment is scheduled for any time after 11am on that day, I can get 10 appointments before I return to [place]. The fax number is [phone].   Will Radiology send you the MRI results? I had it yesterday.   Thank you!  Steve Sims

## 2017-03-18 ENCOUNTER — PATIENT MESSAGE (OUTPATIENT)
Dept: NEUROLOGY | Facility: MEDICAL CENTER | Age: 77
End: 2017-03-18

## 2017-03-20 ENCOUNTER — PATIENT MESSAGE (OUTPATIENT)
Dept: MEDICAL GROUP | Facility: PHYSICIAN GROUP | Age: 77
End: 2017-03-20

## 2017-03-20 ENCOUNTER — PATIENT MESSAGE (OUTPATIENT)
Dept: NEUROLOGY | Facility: MEDICAL CENTER | Age: 77
End: 2017-03-20

## 2017-03-20 NOTE — TELEPHONE ENCOUNTER
From: Steve Sims  To: Deepa VEGAKamille Bronson, Med Ass't  Sent: 3/20/2017 8:44 AM PDT  Subject: Prescription Question    sulema Arenas about that. [facility] in [address] is where I would get them.  Thank you!    ----- Message -----  From: Deepa MKamille Bronson, Med Ass't  Sent: 3/20/17, 8:40 AM  To: Steve Sims  Subject: RE: Prescription Question    The pharmacy I have listed in your chart is for the [facility] in [address]. Is this the pharmacy you want to use or is there a pharmacy here in [place] that you are using? Please advise where you would like the refills sent to. Thank you. Deepa MCDOWELL for Dr. Landin.     ----- Message -----   From: STEVE SIMS   Sent: 3/18/2017 8:37 AM PDT   To: Jose Landin M.D.  Subject: Prescription Question    I have enough carb/levo for one more week. Can I get a renewal prescription?   I have enough ropinerole for 50 days, and trihexyphenidyl for 29 days, more than enough of both to get me through until my appointment with Dr. Landin on April 10. However, because I will be in [place] for a month, it would be ever so much easier to get the prescriptions renewed here before I leave on April 5. Otherwise, from what my pharmacist told me, I can change the location only once, limiting my options. Insurance companies certainly don't make things easy.  Thank you.

## 2017-03-20 NOTE — TELEPHONE ENCOUNTER
From: Steve Sims  To: DIEGO Nagy  Sent: 3/20/2017 2:17 PM PDT  Subject: Non-Urgent Medical Question    Dana  Thank you! I have a telemedicine appointment on March 30, which is ideal.  Thank you so much!  Steev  ----- Message -----  From: DAVID NagyRRUSSELL  Sent: 3/16/2017 4:08 PM PDT  To: Steve Sims  Subject: RE: Non-Urgent Medical Question    No problem.     ----- Message -----   From: STEVE SIMS   Sent: 3/16/2017 3:44 PM PDT   To: DIEGO Nagy  Subject: Non-Urgent Medical Question    Dana thank you so much.    ----- Message -----  From: DAVID NagyRRUSSELL  Sent: 3/16/17, 3:31 PM  To: Steve Sims  Subject: RE: Non-Urgent Medical Question    I will take care of the referral for you. Thanks for getting back to me.       ----- Message -----   From: STEVE SIMS   Sent: 3/16/2017 2:26 PM PDT   To: DAVID NagyRRUSSELL  Subject: Non-Urgent Medical Question    Thank you.   I have lost a lot of weight recently. I have Parkinson's disease and can't eat protein when I take sinemet. I take sinemet every 3 hours. It's hard to know what to eat. A dietitian can help me time my food intake so it doesn't interfere with the sinemet.   ----- Message -----  From: DAVID NagyRRUSSELL  Sent: 3/16/17, 1:57 PM  To: Steve Sims  Subject: RE: Non-Urgent Medical Question    Referral to ortho as placed the same day, we are waiting on [facility] to improve your referral.    As for dietitian and I do not see that this was done. I do need to place this under her diagnoses, for what reason will you be seeing a dietitian?    ----- Message -----   From: STEVE SIMS   Sent: 3/16/2017 12:58 PM PDT   To: DIEGO Nagy  Subject: Non-Urgent Medical Question    Thank you, very much. I got an appointment for April 11.  Jackie was going to make a referral for me to see a dietitian. Do you know if that was done? I could see one on April 10, after my 9am appointment with Dr Landin. Or,  on April 11, if the 10th doesn't work. And, after my MRI, she suggested that I see an orthopedic doctor. Does she have to refer me, or do I just call one? Thanks!      ----- Message -----  From: DIEGO Nagy  Sent: 3/16/17, 12:48 PM  To: Steve Sims  Subject: RE: Non-Urgent Medical Question    Referral was sent on 3/14/17     Referral information sent to the following:  Physical Therapy   [facility]    [address]  [place] [address]  [phone]  [phone]      ----- Message -----   From: STEVE SIMS   Sent: 3/16/2017 8:57 AM PDT   To: GENEVIEVE Philippe  Subject: RE: Non-Urgent Medical Question    Jackie, will you let me know when you make the referral for physical therapy so I can make my appointment? I would like to have the first appointment on the 10th of April, after 11am. I can then make appointments for the 10 sessions of PT, and then make appointments with a dietitian, with the orthopedist for my knee, for new hearing aids, new eyeglasses, a haircut, etc. I will be in [place] until the 6th of May. I'm feeling a time crunch.  Thank you so much.        ----- Message -----  From: GENEVIEVE Philippe  Sent: 3/14/17, 4:04 PM  To: Steve Sims  Subject: RE: Non-Urgent Medical Question    Yes, your MRI is done. Looks stable, although there is some fluid. I will refer to PT.  [facility].  Jackie Villalba      ----- Message -----   From: STEVE SIMS   Sent: 3/14/2017 1:01 PM PDT   To: GENEVIEVE Philippe  Subject: Non-Urgent Medical Question    Jackie, can you order physical therapy for me at [facility] in [place]? Because of my last session with Jarrell Pedroza last October, I can now walk outside. He said I should be walking inside also. Can you send a referral so I can get moving as much and as safely as possible when I'm inside? I'm going to be in [place] starting April 10. If an initial assessment is scheduled for any time after 11am on that day, I can get 10 appointments before I  return to [place]. The fax number is [phone].   Will Radiology send you the MRI results? I had it yesterday.   Thank you!  Kaleigh Tucker

## 2017-03-23 ENCOUNTER — PATIENT MESSAGE (OUTPATIENT)
Dept: NEUROLOGY | Facility: MEDICAL CENTER | Age: 77
End: 2017-03-23

## 2017-03-23 DIAGNOSIS — G20.A1 PARKINSON'S DISEASE: ICD-10-CM

## 2017-03-23 RX ORDER — ROPINIROLE 1 MG/1
1 TABLET, FILM COATED ORAL 3 TIMES DAILY
Qty: 270 TAB | Refills: 1 | Status: SHIPPED | OUTPATIENT
Start: 2017-03-23 | End: 2017-04-10

## 2017-03-24 ENCOUNTER — PATIENT MESSAGE (OUTPATIENT)
Dept: NEUROLOGY | Facility: MEDICAL CENTER | Age: 77
End: 2017-03-24

## 2017-03-24 NOTE — TELEPHONE ENCOUNTER
From: Steve Sims  To: Deepa SILVIAKamille Dobson, Med Ass't  Sent: 3/24/2017 10:08 AM PDT  Subject: Prescription Question    Deepa, here in [facility] is the only pharmacy in [facility].   Steve  ----- Message -----  From: Deepa SILVIAKamille Dobson, Med Ass't  Sent: 3/24/2017 9:51 AM PDT  To: Steve Sims  Subject: RE: Prescription Question    I am so sorry you and our office did everything correctly. I am sorry the pharmacy did not. Can you switch to a different pharmacy?    ----- Message -----   From: STEVE SIMS   Sent: 3/24/2017 9:40 AM PDT   To: Deepa Dobson, Med Ass't  Subject: Prescription Question    Deepa, the pharmacy closes at 7pm. My son was there at 6:45pm yesterday and they didn't have anything from Dr Landin. It is strange and I don't know what to say. I will just have to wait until Monday and hope they have the prescriptions at that time. As I wrote this morning, I just don't understand.    Thank you!  Steve  ----- Message -----  From: Deepa Dobson, Med Ass't  Sent: 3/24/2017 9:28 AM PDT  To: Steve Sims  Subject: RE: Prescription Question    I got a message from you yesterday stating that you were in need of refills for your medications. I did not receive any other requests from the pharmacy that you were in need of refills for the 18th. Dr. Landin sent refills to your pharmacy yesterday they should have gotten them ready for you yesterday to be picked up yesterday. The refills were sent to the [facility]'s in [place]. Deepa MCDOWELL for Dr. Landin.     ----- Message -----   From: STEVE SIMS   Sent: 3/24/2017 7:30 AM PDT   To: Deeap Dobson, Med Ass't  Subject: Prescription Question    Hello, I don't understand. The pharmacy didn't get the prescriptions for the carb/levo or ropinerole. I don't have enough carb/levo to get me through the weekend. The pharmacy is closed until Monday morning at 10. But even if they get the prescriptions today, they won't be ready by 10 on Monday morning. I will struggle  through the weekend on half doses. But I shouldn't have to do that. I originally asked for the renewal on March 18 when I had enough for one more week. I don't understand what happened, why the renewal wasn't made. Will you do it today? Thank you.    ----- Message -----  From: Deepa Dobson Med Ass't  Sent: 3/23/17, 8:09 AM  To: Steve Sims  Subject: RE: Prescription Question    You want the refills to go to [facility] in [place]? Deepa MCDOWELL for Dr. Landin.     ----- Message -----   From: STEVE SIMS   Sent: 3/23/2017 6:57 AM PDT   To: Deepa Dobson Med Ass't  Subject: Prescription Question    Help! I don't have enough of the carb/levo to get me through the weekend. The pharmacy is closed Friday through Monday at 10am. I'm concerned because I can't function without it. I also need more ropinerole. The pharmacy automatically refilled the last of the trihexyphenidyl so I just need the carb/levo and ropinerole.   Thank you!    ----- Message -----  From: Deepa Dobson Med Ass't  Sent: 3/20/17, 8:50 AM  To: Steve Sims  Subject: RE: Prescription Question    OK I will have Dr Landin refill the medications for you. Deepa MCDOWELL     ----- Message -----   From: STEVE SIMS   Sent: 3/20/2017 8:44 AM PDT   To: Deepa Dobson Med Ass't  Subject: Prescription Question    sulema Arenas about that. [facility] in [place] is where I would get them.  Thank you!    ----- Message -----  From: Deepa Dobson Med Ass't  Sent: 3/20/17, 8:40 AM  To: Steve Sims  Subject: RE: Prescription Question    The pharmacy I have listed in your chart is for the [facility] in [place]. Is this the pharmacy you want to use or is there a pharmacy here in [place] that you are using? Please advise where you would like the refills sent to. Thank you. Deepa MCDOWELL for Dr. Landin.     ----- Message -----   From: STEVE SIMS   Sent: 3/18/2017 8:37 AM PDT   To: Jose Landin M.D.  Subject: Prescription Question    I have enough carb/levo for one  more week. Can I get a renewal prescription?   I have enough ropinerole for 50 days, and trihexyphenidyl for 29 days, more than enough of both to get me through until my appointment with Dr. Landin on April 10. However, because I will be in [place] for a month, it would be ever so much easier to get the prescriptions renewed here before I leave on April 5. Otherwise, from what my pharmacist told me, I can change the location only once, limiting my options. Insurance companies certainly don't make things easy.  Thank you.

## 2017-03-24 NOTE — TELEPHONE ENCOUNTER
From: Steve Sims  To: Deepa Dobson, Med Ass't  Sent: 3/24/2017 9:40 AM PDT  Subject: Prescription Question    Deepa, the pharmacy closes at 7pm. My son was there at 6:45pm yesterday and they didn't have anything from Dr Landin. It is strange and I don't know what to say. I will just have to wait until Monday and hope they have the prescriptions at that time. As I wrote this morning, I just don't understand.    Thank you!  Steve  ----- Message -----  From: Deepa SILVIAKamille Dobson, Med Ass't  Sent: 3/24/2017 9:28 AM PDT  To: Steve Sims  Subject: RE: Prescription Question    I got a message from you yesterday stating that you were in need of refills for your medications. I did not receive any other requests from the pharmacy that you were in need of refills for the 18th. Dr. Landin sent refills to your pharmacy yesterday they should have gotten them ready for you yesterday to be picked up yesterday. The refills were sent to the [facility]'s in [place]. Deepa MCDOWELL for Dr. Landin.     ----- Message -----   From: STEVE SIMS   Sent: 3/24/2017 7:30 AM PDT   To: Deepa Dobson Med Ass't  Subject: Prescription Question    Hello, I don't understand. The pharmacy didn't get the prescriptions for the carb/levo or ropinerole. I don't have enough carb/levo to get me through the weekend. The pharmacy is closed until Monday morning at 10. But even if they get the prescriptions today, they won't be ready by 10 on Monday morning. I will struggle through the weekend on half doses. But I shouldn't have to do that. I originally asked for the renewal on March 18 when I had enough for one more week. I don't understand what happened, why the renewal wasn't made. Will you do it today? Thank you.    ----- Message -----  From: Deepa Dobson, Med Ass't  Sent: 3/23/17, 8:09 AM  To: Steve Sims  Subject: RE: Prescription Question    You want the refills to go to [facility]'s in [place]? Deepa Landin.     ----- Message -----    From: STEVE SIMS   Sent: 3/23/2017 6:57 AM PDT   To: Deepa Dobson, Med Ass't  Subject: Prescription Question    Help! I don't have enough of the carb/levo to get me through the weekend. The pharmacy is closed Friday through Monday at 10am. I'm concerned because I can't function without it. I also need more ropinerole. The pharmacy automatically refilled the last of the trihexyphenidyl so I just need the carb/levo and ropinerole.   Thank you!    ----- Message -----  From: Deepa Dobson, Med Ass't  Sent: 3/20/17, 8:50 AM  To: Steve Sims  Subject: RE: Prescription Question    OK I will have Dr Landin refill the medications for you. Deepa MCDOWELL     ----- Message -----   From: STEVE SIMS   Sent: 3/20/2017 8:44 AM PDT   To: Deepa Dobson Med Ass't  Subject: Prescription Question    sulema Arenas about that. [facility] in [place] is where I would get them.  Thank you!    ----- Message -----  From: Deepa Dobson Med Ass't  Sent: 3/20/17, 8:40 AM  To: Steve Sims  Subject: RE: Prescription Question    The pharmacy I have listed in your chart is for the [facility] in [place]. Is this the pharmacy you want to use or is there a pharmacy here in [place] that you are using? Please advise where you would like the refills sent to. Thank you. Deepa MCDOWELL for Dr. Landin.     ----- Message -----   From: STEVE SIMS   Sent: 3/18/2017 8:37 AM PDT   To: Jose Landin M.D.  Subject: Prescription Question    I have enough carb/levo for one more week. Can I get a renewal prescription?   I have enough ropinerole for 50 days, and trihexyphenidyl for 29 days, more than enough of both to get me through until my appointment with Dr. Landin on April 10. However, because I will be in [place] for a month, it would be ever so much easier to get the prescriptions renewed here before I leave on April 5. Otherwise, from what my pharmacist told me, I can change the location only once, limiting my options. Insurance companies certainly  don't make things easy.  Thank you.

## 2017-03-24 NOTE — TELEPHONE ENCOUNTER
From: Kaleigh Sims  To: Deepa Dobson Med Ass't  Sent: 3/24/2017 7:30 AM PDT  Subject: Prescription Question    Hello, I don't understand. The pharmacy didn't get the prescriptions for the carb/levo or ropinerole. I don't have enough carb/levo to get me through the weekend. The pharmacy is closed until Monday morning at 10. But even if they get the prescriptions today, they won't be ready by 10 on Monday morning. I will struggle through the weekend on half doses. But I shouldn't have to do that. I originally asked for the renewal on March 18 when I had enough for one more week. I don't understand what happened, why the renewal wasn't made. Will you do it today? Thank you.    ----- Message -----  From: Deepa Dobson Med Ass't  Sent: 3/23/17, 8:09 AM  To: Kaleigh Sims  Subject: RE: Prescription Question    You want the refills to go to [facility]? Deepa MCDOWELL for Dr. Landin.     ----- Message -----   From: KALEIGH SIMS   Sent: 3/23/2017 6:57 AM PDT   To: Hardeep Gordon Ass't  Subject: Prescription Question    Help! I don't have enough of the carb/levo to get me through the weekend. The pharmacy is closed Friday through Monday at 10am. I'm concerned because I can't function without it. I also need more ropinerole. The pharmacy automatically refilled the last of the trihexyphenidyl so I just need the carb/levo and ropinerole.   Thank you!    ----- Message -----  From: Deepa Dobson Med Ass't  Sent: 3/20/17, 8:50 AM  To: Kaleigh Sims  Subject: RE: Prescription Question    OK I will have Dr Landin refill the medications for you. Deepa MCDOWELL     ----- Message -----   From: KALEIGH SIMS   Sent: 3/20/2017 8:44 AM PDT   To: Deepa Dobson Med Ass't  Subject: Prescription Question    Deepa, sorry about that. [facility] in [place] is where I would get them.  Thank you!    ----- Message -----  From: Deepa Dobson, Med Ass't  Sent: 3/20/17, 8:40 AM  To: Kaleigh Sims  Subject: RE: Prescription  Question    The pharmacy I have listed in your chart is for the [facility] in [place]. Is this the pharmacy you want to use or is there a pharmacy here in [place] that you are using? Please advise where you would like the refills sent to. Thank you. Deepa MCDOWELL for Dr. Landin.     ----- Message -----   From: STEVE SIMS   Sent: 3/18/2017 8:37 AM PDT   To: Jose Landin M.D.  Subject: Prescription Question    I have enough carb/levo for one more week. Can I get a renewal prescription?   I have enough ropinerole for 50 days, and trihexyphenidyl for 29 days, more than enough of both to get me through until my appointment with Dr. Landin on April 10. However, because I will be in [place] for a month, it would be ever so much easier to get the prescriptions renewed here before I leave on April 5. Otherwise, from what my pharmacist told me, I can change the location only once, limiting my options. Insurance companies certainly don't make things easy.  Thank you.

## 2017-03-30 ENCOUNTER — TELEMEDICINE2 (OUTPATIENT)
Dept: SCHEDULING | Facility: IMAGING CENTER | Age: 77
End: 2017-03-30
Payer: MEDICARE

## 2017-03-30 VITALS — BODY MASS INDEX: 16.22 KG/M2 | WEIGHT: 107 LBS | HEIGHT: 68 IN

## 2017-03-30 DIAGNOSIS — G20.A1 PARKINSON'S DISEASE: ICD-10-CM

## 2017-03-30 DIAGNOSIS — E63.8 INADEQUATE DIETARY INTAKE OF PROTEIN: ICD-10-CM

## 2017-03-30 DIAGNOSIS — E11.9 DIABETES MELLITUS WITHOUT COMPLICATION (HCC): ICD-10-CM

## 2017-03-30 PROCEDURE — 97802 MEDICAL NUTRITION INDIV IN: CPT | Mod: GT | Performed by: DIETITIAN, REGISTERED

## 2017-03-30 RX ORDER — TRIHEXYPHENIDYL HYDROCHLORIDE 2 MG/1
TABLET ORAL
Qty: 360 TAB | Refills: 3 | Status: SHIPPED | OUTPATIENT
Start: 2017-03-30 | End: 2018-04-02 | Stop reason: SDUPTHER

## 2017-03-30 NOTE — PROGRESS NOTES
Jackie Villalba, EDWARD.  [address] [place] [address]      Regarding:  Kaleigh Tucker  YOB: 1940  Date of Evaluation: 3/30/2017    TELEMEDICINE CONSULTATION    Dear Dr. Villalba,    The patient is a 76 y.o. female who is here to discuss weight loss d/t nutrient/medication interaction.  Kaleigh is doing a better job now with her appetite and states that since she took marinol a few months ago her appetite has returned.  She is limiting herself to a protein portion the size of a deck of cards and making sure to take her meds 30 minutes before meals.  She is worried about how long it takes her to eat a meal because she takes her meds 5x/day with 3 hours between.  She gets very hungry at 1430 daily and eats a snack between 1530 and 1600 since she takes her meds at 1430.      Review of Medical Records:  Limited patient records were provided by the primary care provider and have been reviewed in preparation for this consultation.     Current Complaints:    Relative to her consultation today the patient complains of Weight Loss      Social History:    The patient  reports that she has never smoked. She has never used smokeless tobacco. She reports that she does not drink alcohol or use illicit drugs.    Past Medical History:    The patient  has a past medical history of Cancer (CMS-HCC); Muscle disorder; Parkinson's disease (CMS-[facility]); and Kidney stone (9/4/2015). She also has no past medical history of Addisons disease (CMS-HCC), Adrenal disorder (CMS-HCC), Allergy, Anemia, Anxiety, Arrhythmia, Arthritis, ASTHMA, Blood transfusion, CATARACT, CHF (congestive heart failure) (CMS-HCC), Clotting disorder (CMS-HCC), COPD, Cushings syndrome (CMS-[facility]), Depression, Diabetes, Diabetic neuropathy (CMS-HCC), EMPHYSEMA, GERD (gastroesophageal reflux disease), Glaucoma, Goiter, Headache(784.0), Heart attack (CMS-[facility]), Heart murmur, HIV (human immunodeficiency virus infection), Hyperlipidemia, Parathyroid  disorder (CMS-HCC), Hypertension, IBD (inflammatory bowel disease), Meningitis, Migraine, OSTEOPOROSIS, Pituitary disease (CMS-HCC), Seizure (CMS-HCC), Sickle cell disease (CMS-HCC), Stroke (CMS-[facility]), Substance abuse, Thyroid disease, Tuberculosis, Ulcer (CMS-[facility]), or Urinary tract infection, site not specified.     Past Surgical History:    The patient  has past surgical history that includes appendectomy (1962); cystoscopy (Right, 9/5/2015); stent placement (9/5/2015); ureteroscopy (Right, 9/5/2015); and knee replacement, total.    Allergies:     The patient has No Known Allergies.    Family History:    The patient's family history includes Cancer in her maternal aunt and maternal grandfather; Heart Disease in her brother, maternal grandmother, and mother; Hyperlipidemia in her sister; Hypertension in her maternal aunt, maternal uncle, and mother.      No notes on file      Discussion/Recommendations:  Kaleigh is actually doing very well and reports that she has gained 6# since her appetite improved.  I think that she is doing a great job with her nutrient timing and as long as she is waiting at least 60 minutes after a meal it is recommended that she can take her medication.  I think that if she wants to continue to gain weight she can try adding a small amount (~1 tablespoon) of heart healthy fat (from plants) to 1-2 meals/day, adding 100-200 kcal/day.  Kaleigh was happy to know that she is doing fairly well and thinks that she can further improve her diet to help with more weight regain.    My recommendations include the following:    Follow up Date:  prn      Electronically signed by LORI Singh RD, LD, CDE  Diabetic Education      This telemedicine consultation was conducted from a [facility] utilizing a videoconference interface.

## 2017-03-30 NOTE — MR AVS SNAPSHOT
"Kaleigh Tucker   3/30/2017 8:00 AM   Telemedicine2   MRN: [id number]    Department:  Health [facility]   Dept Phone:  [phone]    Description:  Female : 1940   Provider:  Ck Singh RD; [facility]; [facility]           Reason for Visit     Weight Loss           Allergies as of 3/30/2017     No Known Allergies      You were diagnosed with     Diabetes mellitus without complication (CMS-HCC)   [118173]       Parkinson's disease (CMS-HCC)   [3708892]       Inadequate dietary intake of protein   [6527943]         Vital Signs     Height Weight Body Mass Index Smoking Status          1.727 m (5' 8\") 48.535 kg (107 lb) 16.27 kg/m2 Never Smoker         Basic Information     Date Of Birth Sex Race Ethnicity Preferred Language    1940 Female White Non- English      Your appointments     Apr 10, 2017  9:00 AM   Follow Up Visit with Jose Landin M.D.   [facility] (--)    [address] [place] [address], [address]  [facility] [address] [phone]           You will be receiving a confirmation call a few days before your appointment from our automated call confirmation system.              Problem List              ICD-10-CM Priority Class Noted - Resolved    Kidney stone N20.0   2015 - Present    Parkinson's disease (CMS-HCC) G20   2015 - Present    Hyponatremia E87.1   2015 - Present    Burning pain R52   2016 - Present    Subcutaneous cyst L72.9   2016 - Present    Insomnia due to medical condition G47.01   2017 - Present    Knee pain M25.569   2017 - Present      Health Maintenance        Date Due Completion Dates    IMM DTaP/Tdap/Td Vaccine (1 - Tdap) 10/22/1959 ---    PAP SMEAR 10/22/1961 ---    MAMMOGRAM 10/22/1980 ---    COLONOSCOPY 10/22/1990 ---    IMM ZOSTER VACCINE 10/22/2000 ---    BONE DENSITY 10/22/2005 ---    IMM PNEUMOCOCCAL 65+ (ADULT) LOW/MEDIUM RISK SERIES (1 of 2 - PCV13) 10/22/2005 ---    IMM INFLUENZA " (1) 9/1/2016 ---            Current Immunizations     No immunizations on file.      Below and/or attached are the medications your provider expects you to take. Review all of your home medications and newly ordered medications with your provider and/or pharmacist. Follow medication instructions as directed by your provider and/or pharmacist. Please keep your medication list with you and share with your provider. Update the information when medications are discontinued, doses are changed, or new medications (including over-the-counter products) are added; and carry medication information at all times in the event of emergency situations     Allergies:  No Known Allergies          Medications  Valid as of: March 30, 2017 -  8:58 AM    Generic Name Brand Name Tablet Size Instructions for use    ALPRAZolam (Tab) XANAX 0.25 MG Take 0.25 mg by mouth at bedtime as needed for Sleep.        Carbidopa-Levodopa (Tab) SINEMET  MG Take 2 tab at 6AM, 12PM and 6PM, and 1 tab at 9AM and 3PM        Cholecalciferol   Take  by mouth.        DULoxetine HCl (Cap DR Particles) CYMBALTA 20 MG Take 1 Cap by mouth every day.        Gabapentin (Cap) NEURONTIN 100 MG Take one capsule at night.        ROPINIRole HCl (Tab) REQUIP 1 MG Take 1 Tab by mouth 3 times a day. @ 0600, 1400, and 1800        ROPINIRole HCl (Tab) REQUIP 1 MG Take 1 Tab by mouth 3 times a day. @ 0600, 1400, and 1800        TraMADol HCl   Take  by mouth.        TraMADol HCl (Tab) ULTRAM 50 MG Take 50 mg by mouth every four hours as needed. Take one tablet by mouth twice daily as needed for pain        TraZODone HCl (Tab) DESYREL 50 MG Take 2 Tabs by mouth every bedtime.        Trihexyphenidyl HCl (Tab) ARTANE 2 MG Take 1 Tab by mouth 4 times a day. @ 6AM, 9AM, 12PM and 3PM        .                 Medicines prescribed today were sent to:     [facility] [address]    [address] [address]    Phone: [phone] Fax:  [phone]    Open 24 Hours?: No      Medication refill instructions:       If your prescription bottle indicates you have medication refills left, it is not necessary to call your provider’s office. Please contact your pharmacy and they will refill your medication.    If your prescription bottle indicates you do not have any refills left, you may request refills at any time through one of the following ways: The online TimePad system (except Urgent Care), by calling your provider’s office, or by asking your pharmacy to contact your provider’s office with a refill request. Medication refills are processed only during regular business hours and may not be available until the next business day. Your provider may request additional information or to have a follow-up visit with you prior to refilling your medication.   *Please Note: Medication refills are assigned a new Rx number when refilled electronically. Your pharmacy may indicate that no refills were authorized even though a new prescription for the same medication is available at the pharmacy. Please request the medicine by name with the pharmacy before contacting your provider for a refill.           [employer] Access Code: Activation code not generated  Current [employer] Status: Active

## 2017-04-03 ENCOUNTER — TELEPHONE (OUTPATIENT)
Dept: MEDICAL GROUP | Facility: PHYSICIAN GROUP | Age: 77
End: 2017-04-03

## 2017-04-10 ENCOUNTER — OFFICE VISIT (OUTPATIENT)
Dept: NEUROLOGY | Facility: MEDICAL CENTER | Age: 77
End: 2017-04-10
Payer: MEDICARE

## 2017-04-10 VITALS
BODY MASS INDEX: 16.37 KG/M2 | HEIGHT: 68 IN | TEMPERATURE: 98.8 F | OXYGEN SATURATION: 97 % | DIASTOLIC BLOOD PRESSURE: 68 MMHG | SYSTOLIC BLOOD PRESSURE: 122 MMHG | HEART RATE: 77 BPM | WEIGHT: 108 LBS | RESPIRATION RATE: 16 BRPM

## 2017-04-10 DIAGNOSIS — G20.A1 PARKINSON'S DISEASE: Primary | ICD-10-CM

## 2017-04-10 PROCEDURE — G8432 DEP SCR NOT DOC, RNG: HCPCS | Performed by: PSYCHIATRY & NEUROLOGY

## 2017-04-10 PROCEDURE — 1101F PT FALLS ASSESS-DOCD LE1/YR: CPT | Mod: 8P | Performed by: PSYCHIATRY & NEUROLOGY

## 2017-04-10 PROCEDURE — G8418 CALC BMI BLW LOW PARAM F/U: HCPCS | Performed by: PSYCHIATRY & NEUROLOGY

## 2017-04-10 PROCEDURE — 99213 OFFICE O/P EST LOW 20 MIN: CPT | Performed by: PSYCHIATRY & NEUROLOGY

## 2017-04-10 PROCEDURE — 1036F TOBACCO NON-USER: CPT | Performed by: PSYCHIATRY & NEUROLOGY

## 2017-04-10 PROCEDURE — 4040F PNEUMOC VAC/ADMIN/RCVD: CPT | Mod: 8P | Performed by: PSYCHIATRY & NEUROLOGY

## 2017-04-10 ASSESSMENT — ENCOUNTER SYMPTOMS
FOCAL WEAKNESS: 1
TREMORS: 1
LOSS OF CONSCIOUSNESS: 0
FALLS: 0

## 2017-04-10 NOTE — MR AVS SNAPSHOT
"Kaleigh Tucker   4/10/2017 9:00 AM   Office Visit   MRN: [id number]    Department:  Neurology [facility]   Dept Phone:  [phone]    Description:  Female : 1940   Provider:  Jose Landin M.D.           Reason for Visit     Follow-Up Parkinson's disease/ discuss meds      Allergies as of 4/10/2017     No Known Allergies      You were diagnosed with     Parkinson's disease (CMS-HCC)   [1751330]  -  Primary       Vital Signs     Blood Pressure Pulse Temperature Respirations Height Weight    122/68 mmHg 77 37.1 °C (98.8 °F) 16 1.727 m (5' 8\") 48.988 kg (108 lb)    Body Mass Index Oxygen Saturation Smoking Status             16.42 kg/m2 97% Never Smoker          Basic Information     Date Of Birth Sex Race Ethnicity Preferred Language    1940 Female White Non- English      Your appointments     Oct 09, 2017 10:40 AM   Follow Up Visit with Jose Landin M.D.   [facility] (--)    [address] [place] [address], [address]  [facility] [id number] [phone]           You will be receiving a confirmation call a few days before your appointment from our automated call confirmation system.              Problem List              ICD-10-CM Priority Class Noted - Resolved    Kidney stone N20.0   2015 - Present    Parkinson's disease (CMS-HCC) G20   2015 - Present    Hyponatremia E87.1   2015 - Present    Burning pain R52   2016 - Present    Subcutaneous cyst L72.9   2016 - Present    Insomnia due to medical condition G47.01   2017 - Present    Knee pain M25.569   2017 - Present      Health Maintenance        Date Due Completion Dates    IMM DTaP/Tdap/Td Vaccine (1 - Tdap) 10/22/1959 ---    PAP SMEAR 10/22/1961 ---    MAMMOGRAM 10/22/1980 ---    COLONOSCOPY 10/22/1990 ---    IMM ZOSTER VACCINE 10/22/2000 ---    BONE DENSITY 10/22/2005 ---    IMM PNEUMOCOCCAL 65+ (ADULT) LOW/MEDIUM RISK SERIES (1 of 2 - PCV13) 10/22/2005 ---            Current Immunizations    " No immunizations on file.      Below and/or attached are the medications your provider expects you to take. Review all of your home medications and newly ordered medications with your provider and/or pharmacist. Follow medication instructions as directed by your provider and/or pharmacist. Please keep your medication list with you and share with your provider. Update the information when medications are discontinued, doses are changed, or new medications (including over-the-counter products) are added; and carry medication information at all times in the event of emergency situations     Allergies:  No Known Allergies          Medications  Valid as of: April 10, 2017 -  9:27 AM    Generic Name Brand Name Tablet Size Instructions for use    Carbidopa-Levodopa (Tab) SINEMET  MG Take 2 tab at 6AM, 12PM and 6PM, and 1 tab at 9AM and 3PM        ROPINIRole HCl (Tab) REQUIP 1 MG Take 1 Tab by mouth 3 times a day. @ 0600, 1400, and 1800        Trihexyphenidyl HCl (Tab) ARTANE 2 MG TAKE ONE TABLET BY MOUTH FOUR TIMES DAILY. @6AM, 9AM, 12PM, AND 3PM        .                 Medicines prescribed today were sent to:     [id number] [place] [place] - [address]    [address] [place] [address]    Phone: [phone] Fax: [phone]    Open 24 Hours?: No      Medication refill instructions:       If your prescription bottle indicates you have medication refills left, it is not necessary to call your provider’s office. Please contact your pharmacy and they will refill your medication.    If your prescription bottle indicates you do not have any refills left, you may request refills at any time through one of the following ways: The online [employer] system (except Urgent Care), by calling your provider’s office, or by asking your pharmacy to contact your provider’s office with a refill request. Medication refills are processed only during regular business hours and may not be available until the next  business day. Your provider may request additional information or to have a follow-up visit with you prior to refilling your medication.   *Please Note: Medication refills are assigned a new Rx number when refilled electronically. Your pharmacy may indicate that no refills were authorized even though a new prescription for the same medication is available at the pharmacy. Please request the medicine by name with the pharmacy before contacting your provider for a refill.        Referral     A referral request has been sent to our patient care coordination department. Please allow 3-5 business days for us to process this request and contact you either by phone or mail. If you do not hear from us by the 5th business day, please call us at [phone].           [employer] Access Code: Activation code not generated  Current [employer] Status: Active

## 2017-04-10 NOTE — PROGRESS NOTES
"Subjective:      Kaleigh Tucker is a 76 y.o. female who presents for follow-up, with a history of moderate Parkinson's disease.    BROOKE Young states symptoms including the peak-dose dyskinesias and wearing-off phenomena continue, she is now having fluctuations in dose response. She can turn \"off\" for up to 45 minutes on a fairly random basis though it seems to be occurring as often is not on a given day. She still requires the use of an assistive device at home, but there she uses the backs of chairs and walls since it is difficult to manipulate a walker. She also has difficulty using handles on the walker when she does use it. She can fall she is not careful, fortunately this does not happen with any kind of regularity. Cognition is intact. Voice quality and volume are maintained. The dyskinesias can be little bit more pleasant, she now describes dystonia with the feet, both with peak-dose effect as well as with wearing off of the drugs. Pain and paresthesias in the feet are becoming more noticeable. Efficacy of her Sinemet remains at about 3 hours.    Medical, surgical and family history is reviewed, there are no new drug allergies. She is on Artane 2 mg 4 times a day, Sinemet 25/100, 2 tablets 5 times a day, and Requip 1 mg, 3 times a day.    Review of Systems   Constitutional: Positive for malaise/fatigue.   Musculoskeletal: Negative for falls.   Neurological: Positive for tremors and focal weakness. Negative for loss of consciousness.   All other systems reviewed and are negative.       Objective:     /68 mmHg  Pulse 77  Temp(Src) 37.1 °C (98.8 °F)  Resp 16  Ht 1.727 m (5' 8\")  Wt 48.988 kg (108 lb)  BMI 16.42 kg/m2  SpO2 97%     Physical Exam    She appears in no acute distress. Vital signs are stable. There is no malar rash or sialorrhea. There is mild hypophonia. PERRLA/EOMI, facial movements are symmetric. There is no malar rash. The neck is supple. Dyskinesias persist involving the upper " extremities and lower extremities. There is no appendicular dystaxia.     Assessment/Plan:     1. Parkinson's disease (CMS-[facility])  Given the severity of her disease, coupled with the dopamine-induced side effects and cyclical problems, now with off episodes entering into the picture, we will need to diminish the dopamine stimulation. Requip will be adjusted downwards first since dopamine agonists are the more likely problem when it comes to dopamine-induced side effects. For the next 2 weeks we will go to twice a day dosing, then once a day, eventually off. Eventually a longer acting dopamine formulation, Rytary, will be added. She also talked about a new MAO-1b inhibitor, Xadago that can be added. We talked briefly about Stalevo, another formulation that has Comtan, a COMT inhibitor, a medication that does what Xadago does, and for a lot less money. In the interim, Artrosalina and her Sinemet will be continued unchanged. These types of change over's are not easy in this stage of the disease, and she was informed of this. A prescription for home health to allow her to obtain some bathroom bars was also provided. I will follow-up with her clinically in about 4 months, phone contact in the interim.    - REFERRAL TO HOME HEALTH    Time: Evaluation 20 minutes for exam, review, discussion, and education  Discussion: As mentioned in this assessment, over 50% of the time spent face-to-face counseling and coordinating care

## 2017-04-17 ENCOUNTER — TELEPHONE (OUTPATIENT)
Dept: MEDICAL GROUP | Facility: PHYSICIAN GROUP | Age: 77
End: 2017-04-17

## 2017-04-21 ENCOUNTER — PATIENT MESSAGE (OUTPATIENT)
Dept: MEDICAL GROUP | Facility: PHYSICIAN GROUP | Age: 77
End: 2017-04-21

## 2017-04-24 ENCOUNTER — TELEPHONE (OUTPATIENT)
Dept: NEUROLOGY | Facility: MEDICAL CENTER | Age: 77
End: 2017-04-24

## 2017-04-24 NOTE — TELEPHONE ENCOUNTER
"Pt is sending this message via [employer] e-mail. Please advise. Thank you.    Prescription Question  Message [id number]     From   Kaleigh Tucker    To   Jose Landin M.D.    Sent   4/21/2017  8:48 AM        Good morning!   I have stopped the ropinerole and still have the same symptoms, i.e., wearing off before the next scheduled dose of sinemet and trihexyphenidyl. Will the newly approved Xadago(?) help? Should I try it?   Thank you!   Kaleigh      Prescription Question        From     Kaleigh Tucker      To     Neurology [facility]      Sent     4/24/2017 10:38 AM            I was feeling okay but as I ate fresh pineapple, sweet and juicy, I got an \"attack\" of dyskinesia that came on suddenly. I noticed it happened once before after I ate some chocolate candy, which I no longer eat. I don't know if the Xadago (sp?) would help with that but I do know that I'm getting dyskinesia (not an attack like this) before it's time for the next carb/levo. I also have pain at night. Help!         "

## 2017-04-25 ENCOUNTER — PATIENT MESSAGE (OUTPATIENT)
Dept: NEUROLOGY | Facility: MEDICAL CENTER | Age: 77
End: 2017-04-25

## 2017-04-25 ENCOUNTER — TELEPHONE (OUTPATIENT)
Dept: NEUROLOGY | Facility: MEDICAL CENTER | Age: 77
End: 2017-04-25

## 2017-04-25 NOTE — TELEPHONE ENCOUNTER
Please tell the patient this drug is not yet available in the [place], even though the FDA gave its approval back in March. I have no information about when it will be made available.  OLAMIDE    E-mail sent to david MENCHACA

## 2017-04-25 NOTE — TELEPHONE ENCOUNTER
"Pt is sending this message via [employer] e-mail. Please advise. Thank you. NIKOLAI    Hello, I need a change for my PD meds. I'm experiencing more \"off\" time than ever. The carb/levo and trihexyphenidyl are lasting about an hour, sometimes 90 minutes. I can't walk, and I have dyskinesia. I also have generalized pain. If I lie down, to try to relax, the dyskinesia gets worse. This is the 3rd or 4th request for help. Please! I'm unable to function as I had been before stopping the ropinerole. Should I start taking it again?   "

## 2017-04-26 NOTE — TELEPHONE ENCOUNTER
If she wants, she can reinitiate the Requip back to where she had been before we stopped. OLAMIDE    E-mail sent to david MENCHACA

## 2017-04-27 ENCOUNTER — PATIENT MESSAGE (OUTPATIENT)
Dept: NEUROLOGY | Facility: MEDICAL CENTER | Age: 77
End: 2017-04-27

## 2017-04-27 ENCOUNTER — TELEPHONE (OUTPATIENT)
Dept: NEUROLOGY | Facility: MEDICAL CENTER | Age: 77
End: 2017-04-27

## 2017-04-27 NOTE — TELEPHONE ENCOUNTER
"Pt is sending this message via [employer] e-mail. Please advise. Thank you. KA    Good morning! I don't like being a pest but the meds I'm taking are not working for me. I resumed the ropinerole and it's as though I had an overdose of a stimulant. They meds, with or without the ropinerole, wear off before the 3 hours until the next set of meds. Something needs to be changed, somehow, because I have more time \"off\" than on. Please help!   Thank you.   Kaleigh"

## 2017-04-28 NOTE — TELEPHONE ENCOUNTER
Please tell the patient she needs to be patient when he comes to restarting ropinirole and seeing its clinical benefit. She is only done this for couple of days, it isn't going to suddenly improve back to baseline when she had been on this drug with the Sinemet and Artane. I need to follow-up with her and we can talk more about this in the office. OLAMIDE    E-mail sent to david MENCHACA

## 2017-04-29 ENCOUNTER — PATIENT MESSAGE (OUTPATIENT)
Dept: NEUROLOGY | Facility: MEDICAL CENTER | Age: 77
End: 2017-04-29

## 2017-05-01 ENCOUNTER — TELEPHONE (OUTPATIENT)
Dept: NEUROLOGY | Facility: MEDICAL CENTER | Age: 77
End: 2017-05-01

## 2017-05-01 NOTE — TELEPHONE ENCOUNTER
"From: Kaleigh Sims  To: Deepa Dobson, Med Ass't  Sent: 4/29/2017 9:22 AM PDT  Subject: Prescription Question    Two questions from a patient patient:  1) How am I supposed to manage the severe dyskinesia that wracks my entire body for 60-90 minutes after I take my meds? I can't lie down, stand or sit. Just writhing in agony, trying to relax, I don't know what to do.  2) is medical marijuana something that would help?  Thank you!    ----- Message -----  From: Deepa Dobson, Med Ass't  Sent: 4/28/17, 9:02 AM  To: Kaleigh Sims  Subject: RE: Prescription Question    Good morning here is the reply from Dr. Landin. \" Please let her know she needs to be patient when he comes to restarting ropinirole and seeing its clinical benefit. She is only done this for couple of days, it isn't going to suddenly improve back to baseline when she had been on this drug with the Sinemet and Artane. I need to follow-up with her and we can talk more about this in the office.\" Thank you. Deepa MCDOWELL for Dr. Landin.     ----- Message -----   From: KALEIGH SIMS   Sent: 4/27/2017 8:44 AM PDT   To: Jose Landin M.D.  Subject: Prescription Question    Good morning! I don't like being a pest but the meds I'm taking are not working for me. I resumed the ropinerole and it's as though I had an overdose of a stimulant. They meds, with or without the ropinerole, wear off before the 3 hours until the next set of meds. Something needs to be changed, somehow, because I have more time \"off\" than on. Please help!   Thank you.  Kaleigh  "

## 2017-05-01 NOTE — TELEPHONE ENCOUNTER
Please tell the patient that this is still going to be a very difficult time for her. Did she get back on the full dose of Requip 3 times a day. If so, we can reduce the dose to twice a day and see if the dyskinesias have improved, she did not do well off the drug entirely because of the profound worsening of all of her symptoms. She may do better instead of 3 times a day to once or twice a day with her Sinemet and Artane unchanged.  For her disease, there is no data supporting the use of medical marijuana, though it is being used at movement disorder centers. It is never something I've tried before, thus I'm uncomfortable prescribing it at this time. OLAMIDE    E-mail sent to david MENCHACA

## 2017-05-01 NOTE — TELEPHONE ENCOUNTER
Pt is sending this message via [employer] e-mail. Please advise. Thank you. KA    Two questions from a patient patient:   1) How am I supposed to manage the severe dyskinesia that wracks my entire body for 60-90 minutes after I take my meds? I can't lie down, stand or sit. Just writhing in agony, trying to relax, I don't know what to do.   2) is medical marijuana something that would help?   Thank you!

## 2017-08-20 DIAGNOSIS — G20.A1 PARKINSON'S DISEASE: ICD-10-CM

## 2017-08-24 ENCOUNTER — PATIENT MESSAGE (OUTPATIENT)
Dept: MEDICAL GROUP | Facility: PHYSICIAN GROUP | Age: 77
End: 2017-08-24

## 2017-08-30 ENCOUNTER — PATIENT MESSAGE (OUTPATIENT)
Dept: MEDICAL GROUP | Facility: PHYSICIAN GROUP | Age: 77
End: 2017-08-30

## 2017-08-30 NOTE — TELEPHONE ENCOUNTER
From: Kaleigh Tucker  To: GENEVIEVE Philippe  Sent: 8/30/2017 10:13 AM PDT  Subject: Non-Urgent Medical Question    Thank you, Jackie. I'm going to get physical therapy in October in [place]. The local massage therapist doesn't do scar tissue so I will have the physical therapist do it next month. In the meantime, I will keep you posted if I have any changes.  Kaleigh    ----- Message -----  From: GENEVIEVE Philippe  Sent: 8/30/17, 10:06 AM  To: Kaleigh Tucker  Subject: RE: Non-Urgent Medical Question    Keep working to find some relief. Glad the burning is gone.  Jackie Vlilalba      ----- Message -----   From: Kaleigh Tucker   Sent: 8/24/2017 2:30 PM PDT   To: GENEVIEVE Philippe  Subject: Non-Urgent Medical Question    I owe you an update but haven't had anything to report. Dr. Konstantin Freeman at [facility] couldn't tell much about my knee, even with the x-ray and MRI results. He thought perhaps inflammation. An anti-inflammatory ointment for 2 weeks did nothing so a 3-month prescription for a generic Mobic just ended. It did help, but I think I have a pinched nerve. I’m going to make an appointment with a local massage therapist. I have had no burning skin since I stopped the tramadol.

## 2017-09-01 ENCOUNTER — TELEPHONE (OUTPATIENT)
Dept: NEUROLOGY | Facility: MEDICAL CENTER | Age: 77
End: 2017-09-01

## 2017-09-01 DIAGNOSIS — G20.A1 PARKINSON'S DISEASE: ICD-10-CM

## 2017-09-01 NOTE — TELEPHONE ENCOUNTER
"----- Message from Jamil Gunn, Med Ass't sent at 8/31/2017  5:04 PM PDT -----  Regarding: Referral for PT  Can you please place a referral for \"land physical therapy\" (not aquatic) to [facility]? Patient would like to come in for an evaluation in October. Form scanned into media.    Thank you.   "

## 2017-09-01 NOTE — TELEPHONE ENCOUNTER
"I am not quite sure what you mean by \"land physical therapy\", I simply redid the referral without referral to aquatic or water modalities.  "

## 2017-09-05 ENCOUNTER — TELEPHONE (OUTPATIENT)
Dept: NEUROLOGY | Facility: MEDICAL CENTER | Age: 77
End: 2017-09-05

## 2017-09-05 NOTE — TELEPHONE ENCOUNTER
"I was asked by my medical assistant to resubmit the physical therapy referral as \"land-based therapy\", not an aquatic based therapy modality. I resubmitted this on September 1, simply as a straightforward physical therapy referral. Referral is in Epic. Hopefully this is what is needed.  "

## 2017-09-05 NOTE — TELEPHONE ENCOUNTER
----- Message from GENEVIEVE Philippe sent at 9/1/2017 11:46 AM PDT -----  Regarding: PT  Hi Dr. Landin,  I see PT was cancelled for this patient. Did I make error in the referral.  Just checking.  aJckie

## 2017-09-05 NOTE — TELEPHONE ENCOUNTER
Patient has a history of moderate Parkinson's disease, the therapy is essentially for gait stabilization, strengthening and range of motion.

## 2017-09-06 ENCOUNTER — TELEPHONE (OUTPATIENT)
Dept: NEUROLOGY | Facility: MEDICAL CENTER | Age: 77
End: 2017-09-06

## 2017-09-06 DIAGNOSIS — G20.A1 PARKINSON'S DISEASE: ICD-10-CM

## 2017-09-07 NOTE — TELEPHONE ENCOUNTER
Please inform the patient that I forwarded a prescription for her Sinemet 25/100, #2 tablets 5 times a day. We talked about single tablet doses only when she was taking a lower dose of Sinemet, this in the evenings.

## 2017-09-07 NOTE — TELEPHONE ENCOUNTER
----- Message from Jamil Gunn, Med Ass't sent at 9/6/2017  3:56 PM PDT -----  Regarding: sinemet  Was forwarded a long string of messages from this patient and (I believe your old MA, Deepa). Long story short, the patient would like a new prescription for sinemet 2 tabs 5x daily. Currently she is only getting 8 tabs a day. Can you please write for a new prescription or is this the dose you would like her on? The patient states you changed it so she would be on 1 tabs a day, but I didn't see any documentation of this. Please advise.

## 2017-10-09 ENCOUNTER — OFFICE VISIT (OUTPATIENT)
Dept: NEUROLOGY | Facility: MEDICAL CENTER | Age: 77
End: 2017-10-09
Payer: MEDICARE

## 2017-10-09 VITALS
HEIGHT: 68 IN | RESPIRATION RATE: 17 BRPM | BODY MASS INDEX: 15.55 KG/M2 | DIASTOLIC BLOOD PRESSURE: 70 MMHG | SYSTOLIC BLOOD PRESSURE: 118 MMHG | OXYGEN SATURATION: 96 % | HEART RATE: 49 BPM | TEMPERATURE: 97.7 F | WEIGHT: 102.6 LBS

## 2017-10-09 DIAGNOSIS — G20.A1 PARKINSON'S DISEASE: ICD-10-CM

## 2017-10-09 PROCEDURE — 99214 OFFICE O/P EST MOD 30 MIN: CPT | Performed by: PSYCHIATRY & NEUROLOGY

## 2017-10-09 RX ORDER — MELOXICAM 7.5 MG/1
7.5 TABLET ORAL DAILY
COMMUNITY
End: 2017-12-27 | Stop reason: SDUPTHER

## 2017-10-09 ASSESSMENT — PAIN SCALES - GENERAL: PAINLEVEL: 7=MODERATE-SEVERE PAIN

## 2017-10-09 ASSESSMENT — ENCOUNTER SYMPTOMS
MEMORY LOSS: 0
FALLS: 0
TREMORS: 1

## 2017-10-09 ASSESSMENT — PATIENT HEALTH QUESTIONNAIRE - PHQ9: CLINICAL INTERPRETATION OF PHQ2 SCORE: 0

## 2017-10-09 NOTE — PROGRESS NOTES
"Subjective:      Kaleigh Tucker is a 76 y.o. female who presents with Her daughter Liliya, for follow-up, with a history of moderate Parkinson's disease.    HPI    When last seen the patient was disconcerted about diffuse dyskinesias. We decided to remove Requip, continuing Artane and Sinemet dosing unchanged, unfortunately she became too stiff and rigid to become tolerable. We then reintroduced Requip slowly, now on 1 mg, twice a day, Artane remains 2 mg, 4 times a day and Sinemet 25/100, #2 tablets 5 times a day, not only did the rigidity and bradykinesia improved, the dyskinesias also did. Her tremor is less problematic still with the Artane. When she added was CBD oil, she uses it twice a day, and this has helped the dyskinesias consistently, and only to a degree.    On her present regimen, the rigidity and bradykinesia has improved, the dyskinesias are probably about the same. They are still near continuous, they can be problematic at nighttime, and this is where the CBD oil is most effective. She denies nightmares, fluctuations, on/off episodes, etc. She walks with assistance, gait initiation and freezing are still problematic, once she starts to walk she does better. Appetite and weight of the same, she drools, but does not have difficulty swallowing. Cognition remains intact. Bowel function is being maintained with regular habit and protocol. There are no issues with urinary retention.    Medical, surgical and family histories are reviewed, there are no new drug allergies. She is on Requip 1 mg, twice a day, Sinemet 25/100, #2 tablets 5 times a day, Artane 2 mg, 4 times a day, and Modic 7.5 mg daily.    Review of Systems   Musculoskeletal: Negative for falls.   Neurological: Positive for tremors.   Psychiatric/Behavioral: Negative for memory loss.        Objective:     /70   Pulse (!) 49   Temp 36.5 °C (97.7 °F)   Resp 17   Ht 1.727 m (5' 8\")   Wt 46.5 kg (102 lb 9.6 oz)   SpO2 96%   BMI 15.60 kg/m²  "     Physical Exam    She appears in her wheelchair, in no acute distress. Vital signs are stable though she is bradycardic at 49. Her neck is supple, there is no sialorrhea or evidence of excessive drooling. Cardiac evaluation is unremarkable.    She is fully oriented, there is no aphasia. PERRLA/EOMI, there is no hypophonia or tachyphemia, facial movements are only slightly diminished, but they are symmetric and there is no sensory loss or bulbar dysfunction. There are some minimal dyskinesias today, tremor is absent, rigidity is still detectable in the upper extremities. There is no obvious weakness. She can stand only minimal assistance. There is significant initiation problems when she walks, but once she starts, she actually does fairly well. Turning is really problematic. There is still notable postural instability when she is pulled backwards. There is no appendicular dystaxia, repetitive movements are diminished in amplitude.     Assessment/Plan:     1. Parkinson's disease (CMS-[facility])  Since there seems to be a dopamine-sensitive improvement with the dyskinesias, I will reintroduced Requip to 3 times a day, maintaining Sinemet and Artane at their present regimen for the next several weeks. We can then reduce her Sinemet slowly, going tablet by tablet allowing smaller incremental changes to hopefully provide benefit with out side effect. They were told that as disease progresses, therapeutic efficacies become more incremental with dosage adjustments, side effects are more easily induced. If we cut down Sinemet enough, the dyskinesias begin to improve, and she is becoming more stiff and rigid, I will then add Requip at higher doses using 0.5 mg increments. She can always use the CBD oil in the interim as a rescue.    Throughout all of this, Artane will remain unchanged. Phone contact in the interim, I will follow-up with her in 6 months.    Time: Evaluation of 25 minutes for exam, review, discussion, and  education  Discussion: As mentioned in the assessment, over 50% of time spent face-to-face counseling and coordinating care

## 2017-11-08 ENCOUNTER — PATIENT MESSAGE (OUTPATIENT)
Dept: NEUROLOGY | Facility: MEDICAL CENTER | Age: 77
End: 2017-11-08

## 2017-11-08 NOTE — TELEPHONE ENCOUNTER
----- Message from Jamil Gunn, Med Ass't sent at 11/8/2017  3:03 PM PST -----  Regarding: FW: Prescription Question  Contact: [phone]      ----- Message -----  From: Kaleigh Tucker  Sent: 11/8/2017   9:57 AM  To: Neurology Mas  Subject: Prescription Question                            My current med schedule    6am: 1 Trihexyphenidyl, 2 carb-levo, 1 Ropinerole, 1 meloxicam  9am: 1 Trihexyphenidyl, 2 carb-levo  12 noon: 1 Trihexyphenidyl, 2 carb-levo  3pm: 1 Trihexyphenidyl, 1 carb-levo, 1 Ropinerole  4pm: 1 carb-levo  6pm: 1 carb-levo (if needed)  9pm: 1 Ropinerole    If I feel dyskinesia coming, I take medical marijuana. It usually works. However, for some reason, I get severe dyskinesia right at dinnertime or before bed. It must be psychological because during the day, I have had only minimal dyskinesia. I can’t stop it, but must let it run its course. I think it’s psychological. Any suggestions?

## 2017-11-08 NOTE — TELEPHONE ENCOUNTER
The patient I am not sure what additional advice I might offer. Per illness certainly can fluctuate on its own, quite often symptoms being more problematic as the day wears on as well. For now I think she will simply have to let things run their course.

## 2017-12-26 ENCOUNTER — PATIENT MESSAGE (OUTPATIENT)
Dept: MEDICAL GROUP | Facility: PHYSICIAN GROUP | Age: 77
End: 2017-12-26

## 2017-12-27 ENCOUNTER — PATIENT MESSAGE (OUTPATIENT)
Dept: MEDICAL GROUP | Facility: PHYSICIAN GROUP | Age: 77
End: 2017-12-27

## 2017-12-27 RX ORDER — MELOXICAM 7.5 MG/1
7.5 TABLET ORAL DAILY
Qty: 30 TAB | Refills: 3 | Status: SHIPPED | OUTPATIENT
Start: 2017-12-27 | End: 2018-04-02

## 2017-12-27 NOTE — TELEPHONE ENCOUNTER
From: Kaleigh Tucker  To: GENEVIEVE Philippe  Sent: 2017 10:05 AM PST  Subject: Prescription Question    Thank you.  The neuropharmacologist recommended a probiotic for me. Could the bloating be from that?    ----- Message -----  From: GENEVIEVE Philippe  Sent: 17, 10:02 AM  To: Kaleigh Tucker  Subject: RE: Prescription Question    I will refill the meloxicam. The bloating if a new problem, needs to have an appointment.  The obvious - is food and bowels.   Jackie Villalba      ----- Message -----   From: Kaleigh Tucker   Sent: 2017 7:39 AM PST   To: GENEVIEVE Philippe  Subject: Prescription Question    Jackie, I hope you had a good [place] and that 2018 is good for you.  I need a prescription for Meloxicam 7.5 mg. Can you take care of that for me? [facility] in [place], [phone]. When I call the knee surgeon, I can't talk so they don't understand what I'm saying. The prescription I had has . Without it, I can't do anything, eg, walk, ride the recumbent bike. The Meloxicam allows me to function. Problem #2 ...  Every evening, I am bloated. Is there a way to prevent that? I can barely snap my pants closed. Sometimes when I lie down to go to sleep, it feels like I have a basketball in my intestinal area.

## 2018-01-07 ENCOUNTER — PATIENT MESSAGE (OUTPATIENT)
Dept: MEDICAL GROUP | Facility: PHYSICIAN GROUP | Age: 78
End: 2018-01-07

## 2018-01-08 ENCOUNTER — PATIENT MESSAGE (OUTPATIENT)
Dept: MEDICAL GROUP | Facility: PHYSICIAN GROUP | Age: 78
End: 2018-01-08

## 2018-01-08 NOTE — TELEPHONE ENCOUNTER
"From: Kaleigh Tucker  To: GENEVIEVE Philippe  Sent: 1/7/2018 10:08 AM PST  Subject: Non-Urgent Medical Question    Jackie, I just read an article, \"Liver Damage Signs You Can't Ignore.\" I have a few, and wonder if I need an appointment. Knee MD prescribed anti-inflammatory ointment that did not work so he prescribed Mobic, which helped a lot. Used it for 6 months, discontinued for 30 days. I was unable to reach him, thus had you prescribe it.  I do have some belly bloat, and my ankles are slightly swollen.   NO bruising problems. NO Tired, but weakness might be from Parkinson's and the knee. Appetite--I'm hungry most of the time. NO Yellow skin and eyes. NO Severe abdominal pain. NO mood swings. NO vomiting. NO short-term memory loss. NO Urine changes. Color is a light yellow. (I do have a lot during the night.) NO skin irritation.  Should I worry?  "

## 2018-01-09 ENCOUNTER — TELEPHONE (OUTPATIENT)
Dept: MEDICAL GROUP | Facility: CLINIC | Age: 78
End: 2018-01-09

## 2018-01-09 ENCOUNTER — PATIENT MESSAGE (OUTPATIENT)
Dept: MEDICAL GROUP | Facility: PHYSICIAN GROUP | Age: 78
End: 2018-01-09

## 2018-01-09 DIAGNOSIS — N20.0 KIDNEY STONE: ICD-10-CM

## 2018-01-09 DIAGNOSIS — G47.01 INSOMNIA DUE TO MEDICAL CONDITION: ICD-10-CM

## 2018-01-09 DIAGNOSIS — G20.A1 PARKINSON'S DISEASE: ICD-10-CM

## 2018-01-09 NOTE — TELEPHONE ENCOUNTER
"From: Kaleigh Tucker  To: GENEVIEVE Philippe  Sent: 1/8/2018 3:04 PM PST  Subject: Non-Urgent Medical Question    Jackie,    Thank you.    Do I just call the [facility] and make an appointment with them?    Kaleigh     ----- Message -----  From: GENEVIEVE Philippe  Sent: 1/8/18, 1:03 PM  To: Kaleigh Tucker  Subject: RE: Non-Urgent Medical Question    Reviewed you last liver enzymes and they are normal. I would make sure you check you blood work a couple times a year.  Jackie Villalba      ----- Message -----   From: Kaleigh Tucker   Sent: 1/7/2018 10:08 AM PST   To: GENEVIEVE Philippe  Subject: Non-Urgent Medical Question    Jackie, I just read an article, \"Liver Damage Signs You Can't Ignore.\" I have a few, and wonder if I need an appointment. Knee MD prescribed anti-inflammatory ointment that did not work so he prescribed Mobic, which helped a lot. Used it for 6 months, discontinued for 30 days. I was unable to reach him, thus had you prescribe it.  I do have some belly bloat, and my ankles are slightly swollen.   NO bruising problems. NO Tired, but weakness might be from Parkinson's and the knee. Appetite--I'm hungry most of the time. NO Yellow skin and eyes. NO Severe abdominal pain. NO mood swings. NO vomiting. NO short-term memory loss. NO Urine changes. Color is a light yellow. (I do have a lot during the night.) NO skin irritation.  Should I worry?  "

## 2018-01-09 NOTE — TELEPHONE ENCOUNTER
Will you be putting lab orders in for this patient and we'll schedule a follow up or do you want to see her first?    Last lab orders are from 2016.    Thank you!

## 2018-01-09 NOTE — TELEPHONE ENCOUNTER
"From: Kaleigh Tucker  To: GENEVIEVE Philippe  Sent: 1/9/2018 12:07 PM PST  Subject: Non-Urgent Medical Question    Jackie,   I just called the clinic. The lady who answered said I don't need an appointment. When I told her that you said I needed to see you, she tried to find a day for an appointment but couldn't find any. She was having a hard time but continued to say I didn't have to make an appointment. The lab takes walk ins.  Do you want me to make an appointment for some time after the blood work?  Kaleigh       ----- Message -----  From: GENEVIEVE Philippe  Sent: 1/9/18, 11:29 AM  To: Kaleigh Tucker  Subject: RE: Non-Urgent Medical Question    Need to make appointment to see me for labs.  Call [facility] for appointment.  Jackie Villalba      ----- Message -----   From: Kaleigh Tucker   Sent: 1/8/2018 3:04 PM PST   To: GENEVIEVE Philippe  Subject: Non-Urgent Medical Question    Jackie,    Thank you.    Do I just call the [facility] and make an appointment with them?    Kaleigh     ----- Message -----  From: GENEVIEVE Philippe  Sent: 1/8/18, 1:03 PM  To: Kaleigh Tucker  Subject: RE: Non-Urgent Medical Question    Reviewed you last liver enzymes and they are normal. I would make sure you check you blood work a couple times a year.  Jackie Villalba      ----- Message -----   From: Kaleigh Tucker   Sent: 1/7/2018 10:08 AM PST   To: GENEVIEVE Philippe  Subject: Non-Urgent Medical Question    Jackie, I just read an article, \"Liver Damage Signs You Can't Ignore.\" I have a few, and wonder if I need an appointment. Knee MD prescribed anti-inflammatory ointment that did not work so he prescribed Mobic, which helped a lot. Used it for 6 months, discontinued for 30 days. I was unable to reach him, thus had you prescribe it.  I do have some belly bloat, and my ankles are slightly swollen.   NO bruising problems. NO Tired, but weakness might be from Parkinson's and the knee. Appetite--I'm hungry most of the time. NO Yellow skin and eyes. NO " Severe abdominal pain. NO mood swings. NO vomiting. NO short-term memory loss. NO Urine changes. Color is a light yellow. (I do have a lot during the night.) NO skin irritation.  Should I worry?

## 2018-01-10 ENCOUNTER — NON-PROVIDER VISIT (OUTPATIENT)
Dept: MEDICAL GROUP | Facility: CLINIC | Age: 78
End: 2018-01-10
Payer: MEDICARE

## 2018-01-10 DIAGNOSIS — E87.1 HYPONATREMIA: ICD-10-CM

## 2018-01-10 PROCEDURE — 36415 COLL VENOUS BLD VENIPUNCTURE: CPT | Performed by: NURSE PRACTITIONER

## 2018-01-10 NOTE — PROGRESS NOTES
Kaleigh Tucker is a 77 y.o. female here for a non-provider visit for Venipuncture    If abnormal was an in office provider notified upon receipt of results (if so, indicate provider)? Yes  Routed to PCP? Yes

## 2018-01-16 ENCOUNTER — TELEPHONE (OUTPATIENT)
Dept: MEDICAL GROUP | Facility: CLINIC | Age: 78
End: 2018-01-16

## 2018-02-27 ENCOUNTER — PATIENT MESSAGE (OUTPATIENT)
Dept: MEDICAL GROUP | Facility: PHYSICIAN GROUP | Age: 78
End: 2018-02-27

## 2018-02-27 DIAGNOSIS — G89.29 CHRONIC PAIN OF LEFT KNEE: ICD-10-CM

## 2018-02-27 DIAGNOSIS — G20.A1 PARKINSON'S DISEASE: ICD-10-CM

## 2018-02-27 DIAGNOSIS — M25.562 CHRONIC PAIN OF LEFT KNEE: ICD-10-CM

## 2018-02-28 NOTE — TELEPHONE ENCOUNTER
From: Kaleigh Tucker  To: GENEVIEVE Philippe  Sent: 2/27/2018 4:36 PM PST  Subject: Non-Urgent Medical Question    Jackie Mac,  Can you make a referral for physical therapy for me at [facility] as before? I don't remember what information you need. Please let me know and I will get it to you as soon as I can.  Thank you so much   Kaleigh

## 2018-03-18 DIAGNOSIS — G20.A1 PARKINSON'S DISEASE: ICD-10-CM

## 2018-03-22 RX ORDER — ROPINIROLE 1 MG/1
TABLET, FILM COATED ORAL
Qty: 270 TAB | Refills: 3 | Status: SHIPPED | OUTPATIENT
Start: 2018-03-22 | End: 2018-10-05

## 2018-04-02 ENCOUNTER — OFFICE VISIT (OUTPATIENT)
Dept: NEUROLOGY | Facility: MEDICAL CENTER | Age: 78
End: 2018-04-02
Payer: MEDICARE

## 2018-04-02 VITALS
DIASTOLIC BLOOD PRESSURE: 74 MMHG | TEMPERATURE: 99 F | HEIGHT: 68 IN | HEART RATE: 92 BPM | SYSTOLIC BLOOD PRESSURE: 120 MMHG

## 2018-04-02 DIAGNOSIS — G20.A1 PARKINSON'S DISEASE: Primary | ICD-10-CM

## 2018-04-02 DIAGNOSIS — G20.A1 PARKINSON'S DISEASE: ICD-10-CM

## 2018-04-02 PROCEDURE — 99213 OFFICE O/P EST LOW 20 MIN: CPT | Performed by: PSYCHIATRY & NEUROLOGY

## 2018-04-02 RX ORDER — ROPINIROLE 2 MG/1
1 TABLET, FILM COATED, EXTENDED RELEASE ORAL DAILY
Qty: 30 TAB | Refills: 5 | Status: SHIPPED | OUTPATIENT
Start: 2018-04-02 | End: 2018-04-02 | Stop reason: SDUPTHER

## 2018-04-02 RX ORDER — ROPINIROLE 2 MG/1
1 TABLET, FILM COATED, EXTENDED RELEASE ORAL DAILY
Qty: 30 TAB | Refills: 5 | Status: SHIPPED | OUTPATIENT
Start: 2018-04-02 | End: 2018-06-11

## 2018-04-02 RX ORDER — TRIHEXYPHENIDYL HYDROCHLORIDE 2 MG/1
TABLET ORAL
Qty: 360 TAB | Refills: 3 | Status: SHIPPED | OUTPATIENT
Start: 2018-04-02 | End: 2018-10-02 | Stop reason: SDUPTHER

## 2018-04-02 ASSESSMENT — ENCOUNTER SYMPTOMS
TREMORS: 1
LOSS OF CONSCIOUSNESS: 0

## 2018-04-02 NOTE — PROGRESS NOTES
"Subjective:      Kaleigh Tucker is a 77 y.o. female who presents with her daughter Liliya, for follow-up, with a history of moderately severe Parkinson's disease.    HPI    Since last seen, Hannah has continued to have problems with the dyskinesias on her present dosing regimen, she remains on ropinirole 1 mg in the morning and evening, another at bedtime which helps her get through the night with the dyskinesias that she has. She takes Artane 2 mg along with Sinemet 25/100, #2 tablets, 4 times a day beginning at about 5:30, and then every 3 hours through the middle of the afternoon, she takes a fifth dose of Sinemet at about 6 PM. She still has the end of dose phenomena with this regimen. The dyskinesias occur as a peak effect of the drug, she denies burning and painful dysesthesias with wearing-off. There are some day-to-day fluctuations though these are not too severe.    She still can walk limited distances at home, still typically she uses the walker or her wheelchair to balance her. There are no changes with appetite. Her voice is still clear though the volume has diminished slightly. She has no issues with swallowing. She does sleep well, there are some nightmares but she denies hallucinations. She does not fall with regularity. Bowel and bladder functions are stable, she alternates between diarrhea and constipation in the former.    Medical, surgical and family histories are reviewed, there are no new drug allergies. Other than the medications as above, she is on vitamin D supplement. Socially, she is moving into the [place] area to make it more convenient for her daughter to be with her and for her to follow-up with her health care specialists.    Review of Systems   Neurological: Positive for tremors. Negative for loss of consciousness.   All other systems reviewed and are negative.       Objective:     /74   Pulse 92   Temp 37.2 °C (99 °F)   Ht 1.727 m (5' 8\")      Physical Exam    She appears in no acute " distress. Her vital signs are stable. There is no malar rash or sialorrhea. The neck is supple, range of motion is full. She is fully oriented, there is no clear evidence of focal cognitive or language deficit. There is only minimal hypophonia and tachyphemia. PERRLA/EOMI and facial movements are symmetric. There is no tremor, rigidity is mild bilaterally. There is minimal generalized bradykinesia with all movements. She require significant assistance to stand, she can do so briefly. I did not push it. There is no appendicular dystaxia. There are some minimal dyskinetic movements seen throughout the interview and evaluation.     Assessment/Plan:     1. Parkinson's disease (CMS-[facility])  Her peak dose dyskinesias could respond to a newer longer acting formulation of amantadine, but the issues of insurance coverage remain. The wearing off dyskinesias at nighttime are not likely to respond this clearly to this drug formulation, but hopefully if we need to go there, we will see benefit. For now, I am going to adjust the ropinirole a bit further, using a ropinirole ER 2 mg extended form in the mornings, substituting for Requip IR 1 mg morning and afternoon dose, she is to continue the Requip IR to help with the wearing-off dyskinesias. Further dose increases using Requip ER formulation might then be beneficial throughout the day and even into the evening. Sinemet and Artane will continue at the present regimen. We'll follow-up otherwise in several months, phone contact in the interim. I am hoping to diminish the use of dopamine throughout the day in the hopes of relieving some of the peak dose dyskinesias. Artane is required, we already tried discontinuing this medicine and she paid the price.    - Ropinirole HCl 2 MG TABLET SR 24 HR; Take 1 Tab by mouth every day.  Dispense: 30 Tab; Refill: 5  - trihexyphenidyl (ARTANE) 2 MG Tab; TAKE ONE TABLET BY MOUTH FOUR TIMES DAILY. @6AM, 9AM, 12PM, AND 3PM  Dispense: 360 Tab; Refill:  3  - carbidopa-levodopa (SINEMET)  MG Tab; Take 2 Tabs by mouth 5 Times a Day.  Dispense: 900 Tab; Refill: 6    Time: Evaluation of 20 minutes for exam, review, discussion, and education  Discussion: As mentioned in the assessment, over 70% of the time spent face-to-face counseling and coordinating care

## 2018-04-02 NOTE — TELEPHONE ENCOUNTER
From: Kaleigh Tucker  To: Jose Landin M.D.  Sent: 2/1/2017 9:09 AM PST  Subject: Prescription Question    Deepa, sort of off-topic. My physical therapist recommended safety rails in my house. I had them installed. I found out yesterday that I can deduct the cost if I have a written prescription from my doctor. Can I get such? I have an appointment with Dr Landin on April 10, so I could get it then, if so. Thank you, Kaleigh  PS: The Cymbalta allows me to get enough sleep! Yeah!  
No

## 2018-04-16 NOTE — TELEPHONE ENCOUNTER
*Medication is currently D/C'd*  Was the patient seen in the last year in this department? No     Does patient have an active prescription for medications requested? No     Received Request Via: Pharmacy    Pt met protocol?: No     Last OV 01/2017 via telemedicine

## 2018-04-17 ENCOUNTER — PATIENT MESSAGE (OUTPATIENT)
Dept: MEDICAL GROUP | Facility: PHYSICIAN GROUP | Age: 78
End: 2018-04-17

## 2018-04-17 RX ORDER — MELOXICAM 7.5 MG/1
TABLET ORAL
OUTPATIENT
Start: 2018-04-17

## 2018-04-17 NOTE — TELEPHONE ENCOUNTER
From: Kaleigh Tucker  To: GENEVIEVE Philippe  Sent: 4/17/2018 1:35 PM PDT  Subject: RX    Jackie, the original prescription was from dr Brennen Lewis at the [facility] in [place] for my knee pain. He could see nothing on X-ray or MRI. He prescribed a compound anti inflammatory ointment that did nothing. Then he prescribed the Mobic. I took it for about 6 months, renewed it and I took it for one month, and then stopped. I've stopped taking it because I don't want to cause any problems with my kidneys or liver.     BTW...I bought a house in [place] and will move from [place] on June 1. I will update my address and phone number when I get moved.  Kaleigh     ----- Message -----  From: GENEVIEVE Philippe  Sent: 4/17/18, 1:19 PM  To: Kaleigh Tucker  Subject: RX     Kaleigh,  I can't a request for refill on your MOBIC. I did not refill this has Dr. Noble discontinued.  Jackie Villalba

## 2018-04-24 ENCOUNTER — PATIENT MESSAGE (OUTPATIENT)
Dept: NEUROLOGY | Facility: MEDICAL CENTER | Age: 78
End: 2018-04-24

## 2018-04-26 ENCOUNTER — TELEMEDICINE ORIGINATING SITE VISIT (OUTPATIENT)
Dept: MEDICAL GROUP | Facility: CLINIC | Age: 78
End: 2018-04-26
Payer: MEDICARE

## 2018-04-26 ENCOUNTER — TELEMEDICINE2 (OUTPATIENT)
Dept: MEDICAL GROUP | Facility: PHYSICIAN GROUP | Age: 78
End: 2018-04-26
Payer: MEDICARE

## 2018-04-26 VITALS
WEIGHT: 102 LBS | RESPIRATION RATE: 16 BRPM | TEMPERATURE: 99.4 F | SYSTOLIC BLOOD PRESSURE: 115 MMHG | HEIGHT: 68 IN | HEART RATE: 86 BPM | DIASTOLIC BLOOD PRESSURE: 72 MMHG | OXYGEN SATURATION: 96 % | BODY MASS INDEX: 15.46 KG/M2

## 2018-04-26 DIAGNOSIS — M79.89 LEG SWELLING: ICD-10-CM

## 2018-04-26 DIAGNOSIS — L03.116 CELLULITIS OF LEFT LOWER EXTREMITY: ICD-10-CM

## 2018-04-26 PROCEDURE — 99213 OFFICE O/P EST LOW 20 MIN: CPT | Mod: GT | Performed by: NURSE PRACTITIONER

## 2018-04-26 RX ORDER — CEPHALEXIN 250 MG/1
250 CAPSULE ORAL 4 TIMES DAILY
Qty: 40 CAP | Refills: 0 | Status: SHIPPED | OUTPATIENT
Start: 2018-04-26 | End: 2018-10-02

## 2018-04-26 NOTE — ASSESSMENT & PLAN NOTE
This is a 77-year-old female with Parkinson's disease manifested by severe upper and lower body tremors. Patient is actively in physical therapy and sees neurology. Usually she pays attention to most of her symptoms but something recently slip by. She has had left lower leg swelling for about 2 weeks. She's developed redness and pain with this swelling. She denies any form of trauma or injury but it is hard to tell as she has very advanced shaking and tremors to the lower body. She also states that there is not been any change from baseline in terms of fever or malaise. But her leg is painful. She is unaware of any allergies to antibiotics.

## 2018-04-26 NOTE — PROGRESS NOTES
Chief Complaint   Patient presents with   • Leg Swelling       HISTORY OF PRESENT ILLNESS: Patient is a 77 y.o. female BRIELLE,  patient, who presents today to discuss:   Verified Identification with patient.  Secured video conference with RN presenter in [facility]        Cellulitis of left lower extremity  This is a 77-year-old female with Parkinson's disease manifested by severe upper and lower body tremors. Patient is actively in physical therapy and sees neurology. Usually she pays attention to most of her symptoms but something recently slip by. She has had left lower leg swelling for about 2 weeks. She's developed redness and pain with this swelling. She denies any form of trauma or injury but it is hard to tell as she has very advanced shaking and tremors to the lower body. She also states that there is not been any change from baseline in terms of fever or malaise. But her leg is painful. She is unaware of any allergies to antibiotics.        Allergies:Patient has no known allergies.    Current Outpatient Prescriptions Ordered in [facility]   Medication Sig Dispense Refill   • vitamin D (CHOLECALCIFEROL) 1000 UNIT Tab Take 1,000 Units by mouth every day.     • trihexyphenidyl (ARTANE) 2 MG Tab TAKE ONE TABLET BY MOUTH FOUR TIMES DAILY. @6AM, 9AM, 12PM, AND 3PM 360 Tab 3   • carbidopa-levodopa (SINEMET)  MG Tab Take 2 Tabs by mouth 5 Times a Day. 900 Tab 6   • Ropinirole HCl 2 MG TABLET SR 24 HR Take 1 Tab by mouth every day. 30 Tab 5   • ROPINIRole (REQUIP) 1 MG Tab TAKE ONE TABLET BY MOUTH THREE TIMES DAILY, AT 6 AM, 2 PM, AND 6 PM    270 Tab 3   • ropinirole (REQUIP) 1 MG Tab Take 1 Tab by mouth 3 times a day. @ 0600, 1400, and 1800 (Patient taking differently: Take 1 mg by mouth 2 Times a Day. @ 0600, 1400, and 1800) 270 Tab 3     No current Epic-ordered facility-administered medications on file.        Past Medical History:   Diagnosis Date   • Cancer (CMS-[facility])     melanoma on back, surgically  "removed in . Basal cell on scalp removed    • Kidney stone 2015   • Muscle disorder     Parkinsons   • Parkinson's disease (CMS-[facility])        Social History   Substance Use Topics   • Smoking status: Never Smoker   • Smokeless tobacco: Never Used   • Alcohol use No       Family Status   Relation Status   • Mother    • Father    • Sister Alive   • Brother    • Maternal Aunt    • Maternal Grandmother    • Maternal Grandfather    • Paternal Grandmother    • Paternal Grandfather    • Maternal Uncle      Family History   Problem Relation Age of Onset   • Heart Disease Mother      Heart attack/   • Hypertension Mother    • Hyperlipidemia Sister    • Heart Disease Brother    • Cancer Maternal Aunt      stomach cancer/   • Hypertension Maternal Aunt    • Heart Disease Maternal Grandmother    • Cancer Maternal Grandfather      throat cancer   • Hypertension Maternal Uncle            Exam:  Blood pressure 115/72, pulse 86, temperature 37.4 °C (99.4 °F), resp. rate 16, height 1.727 m (5' 8\"), weight 46.3 kg (102 lb), SpO2 96 %.  General:  Thin, sitting in wheel chair.   Communication: Conversation is tremulous  Respiratory: Effort-normal respiratory effort  Auscultation with telesteth: Normal breath sounds  Cardiovascular: Regular rate a  Peripheral: Legs inspected.   Left lower leg red and swollen to ankle. blanches  Foot without any redness, swelling or wounds   Extremities: no clubbing, cyanosis, edema, petechiae  Neurological:   Severe tremors upper, lower extremities. Facial.    Please note that this dictation was created using voice recognition software. I have made every reasonable attempt to correct obvious errors, but I expect that there are errors of grammar and possibly content that I did not discover before finalizing the note.    Assessment/Plan:  1. Cellulitis of left lower extremity           Current Outpatient " Prescriptions:   •  cephALEXin, 250 mg, Oral, 4XDAY  Elevate leg three times a day.  Email on progress.  If fever, increased swelling and redness go to ER.

## 2018-04-30 ENCOUNTER — PATIENT MESSAGE (OUTPATIENT)
Dept: MEDICAL GROUP | Facility: PHYSICIAN GROUP | Age: 78
End: 2018-04-30

## 2018-05-07 ENCOUNTER — PATIENT MESSAGE (OUTPATIENT)
Dept: MEDICAL GROUP | Facility: PHYSICIAN GROUP | Age: 78
End: 2018-05-07

## 2018-05-09 ENCOUNTER — TELEMEDICINE2 (OUTPATIENT)
Dept: URGENT CARE | Facility: CLINIC | Age: 78
End: 2018-05-09
Payer: MEDICARE

## 2018-05-09 ENCOUNTER — TELEMEDICINE ORIGINATING SITE VISIT (OUTPATIENT)
Dept: MEDICAL GROUP | Facility: CLINIC | Age: 78
End: 2018-05-09
Payer: MEDICARE

## 2018-05-09 VITALS
RESPIRATION RATE: 16 BRPM | BODY MASS INDEX: 15.46 KG/M2 | HEIGHT: 68 IN | OXYGEN SATURATION: 97 % | DIASTOLIC BLOOD PRESSURE: 84 MMHG | SYSTOLIC BLOOD PRESSURE: 110 MMHG | TEMPERATURE: 98.3 F | WEIGHT: 102 LBS

## 2018-05-09 DIAGNOSIS — R60.9 EDEMA, UNSPECIFIED TYPE: ICD-10-CM

## 2018-05-09 DIAGNOSIS — L03.116 CELLULITIS OF LEFT LOWER EXTREMITY: ICD-10-CM

## 2018-05-09 PROCEDURE — 99214 OFFICE O/P EST MOD 30 MIN: CPT | Performed by: PHYSICIAN ASSISTANT

## 2018-05-09 RX ORDER — DOXYCYCLINE HYCLATE 100 MG
100 TABLET ORAL 2 TIMES DAILY
Qty: 14 TAB | Refills: 0 | Status: SHIPPED | OUTPATIENT
Start: 2018-05-09 | End: 2018-05-16

## 2018-05-09 ASSESSMENT — ENCOUNTER SYMPTOMS
PALPITATIONS: 0
EDEMA: 1
VOMITING: 0
FEVER: 0
COUGH: 0
SHORTNESS OF BREATH: 0
NAUSEA: 0
MYALGIAS: 0
CHILLS: 0
BRUISES/BLEEDS EASILY: 0

## 2018-05-09 NOTE — PROGRESS NOTES
"Subjective:   Kaleigh Tucker is a 77 y.o. female who presents for Edema        Edema   This is a recurrent problem. The current episode started 1 to 4 weeks ago. Pertinent negatives include no chest pain, chills, coughing, fever, myalgias, nausea, rash or vomiting.   Pt had been tx'ed w/ keflex x 10d for presumed LE cellulitis, RTC (remotely, in [place], on telemed) for some further tx. She has PMH of parkinsons. She is seen via telemed in [place]. Her son is present at time of eval as is the RN who saw pt last visit. They state that the redness did marginally improved following course of Keflex. They did notice the redness receding from black lines previously drawn on leg. She continues to deny shortness of breath, chest pain or palpitations. She denies fevers, chills or body aches. She denies past medical history of diabetes. We discussed additional course of antibiotics for treatment of lower extremity cellulitis.     Review of Systems   Constitutional: Negative for chills and fever.   Respiratory: Negative for cough and shortness of breath.    Cardiovascular: Positive for leg swelling ( trace L>R). Negative for chest pain and palpitations.   Gastrointestinal: Negative for nausea and vomiting.   Musculoskeletal: Negative for myalgias.   Skin: Negative for rash.        Denies breakage of skin   Endo/Heme/Allergies: Does not bruise/bleed easily.     No Known Allergies    Objective:   /84   Temp 36.8 °C (98.3 °F)   Resp 16   Ht 1.727 m (5' 8\")   Wt 46.3 kg (102 lb)   SpO2 97%   BMI 15.51 kg/m²   Physical Exam   Constitutional: She is oriented to person, place, and time. She appears well-developed. No distress.   Evident tremor c/w parkinsons, pt seated in wheelchair w/ son present   HENT:   Head: Normocephalic and atraumatic.   Right Ear: External ear normal.   Left Ear: External ear normal.   Nose: Nose normal.   Eyes: Conjunctivae and lids are normal. Right eye exhibits no discharge. Left eye exhibits no " discharge.   Neck: Neck supple.   Pulmonary/Chest: Effort normal. No respiratory distress.   Musculoskeletal: Normal range of motion.   Neurological: She is alert and oriented to person, place, and time. She is not disoriented.   Skin: Skin is intact. No abrasion and no rash noted. She is not diaphoretic. There is erythema.   Visual exam of lower extremity is somewhat limited secondary to video quality of TeleMed; RN, present today as well as at last evaluation. Describes improved but still present erythema to left lower extremity extending now up to approximately mid calf. Skin is intact. Slight appreciation of increased edema on left greater than right calf.   Psychiatric: Her speech is normal and behavior is normal.   Nursing note and vitals reviewed.        Assessment/Plan:   Assessment    1. Cellulitis of left lower extremity  Supportive care is reviewed with patient/caregiver - recommend to push PO fluids and electrolytes,  take full course of Rx, take with probiotics, observe for resolution  Return to clinic with lack of resolution or progression of symptoms.  Close trending of s/sx and improvement vs worsening; discuss appt w/ or plan to f/u w/ PCP as completion of abx, doxy now for MRSA coverage as well      ER precautions with any worsening symptoms are reviewed with patient/caregiver and they do express understanding      2. Edema, unspecified type    - doxycycline (VIBRAMYCIN) 100 MG Tab; Take 1 Tab by mouth 2 times a day for 7 days.  Dispense: 14 Tab; Refill: 0    Differential diagnosis, natural history, supportive care, and indications for immediate follow-up discussed.

## 2018-05-16 ENCOUNTER — PATIENT MESSAGE (OUTPATIENT)
Dept: MEDICAL GROUP | Facility: PHYSICIAN GROUP | Age: 78
End: 2018-05-16

## 2018-05-17 NOTE — TELEPHONE ENCOUNTER
From: Kaleigh Tucker  To: GENEVIEVE Philippe  Sent: 5/16/2018 10:32 AM PDT  Subject: Non-Urgent Medical Question    Que Mejia,  I'm moving to [place] on June 1. Is that soon enough? I don't know who to see. Can you refer me to someone?  Thank you,  Kaleigh Tucker     ----- Message -----  From: GENEVIEVE Philippe  Sent: 5/16/18, 10:23 AM  To: Kaleigh Tucker  Subject: RE: Non-Urgent Medical Question    I am concerned that the redness is from a different problem. This problem may be your circulation. However, TELEMED is not the right way to see this problem.   You are going to have to see some one in person. Now, if you are going to relocate in [place] soon, you could wait, but if not, I would see Urgent care in person - or establish with primary care in [place].  Jackie     ----- Message -----   From: Kaleigh Tucker   Sent: 5/16/2018 9:12 AM PDT   To: GENEVIEVE Philippe  Subject: Non-Urgent Medical Question    Que Mejia,  I have finished the 7 days of vibramycin prescribed for me by Kerwin Russo. Other than being able to see the bones on the top of my feet, there is no improvement in either the redness or tightness in either leg. Should I try something else, or ???  Thank you,  Kaleigh Tucker

## 2018-06-11 ENCOUNTER — TELEPHONE (OUTPATIENT)
Dept: NEUROLOGY | Facility: MEDICAL CENTER | Age: 78
End: 2018-06-11

## 2018-06-11 DIAGNOSIS — G20.A1 PARKINSON'S DISEASE: ICD-10-CM

## 2018-06-11 RX ORDER — ROPINIROLE 2 MG/1
1 TABLET, FILM COATED, EXTENDED RELEASE ORAL 2 TIMES DAILY
Qty: 60 TAB | Refills: 6 | Status: SHIPPED | OUTPATIENT
Start: 2018-06-11 | End: 2018-10-02

## 2018-06-12 NOTE — TELEPHONE ENCOUNTER
----- Message from Jamil Gunn, Med Ass't sent at 6/11/2018  8:06 AM PDT -----  Regarding: FW: Prescription Question  Contact: [phone]      ----- Message -----  From: Kaleigh Tucker  Sent: 6/9/2018   2:54 PM  To: Neurology Mas  Subject: Prescription Question                            Hi, Doctor Mushtaq,    I have adjusted my meds, schedule and dose.     6am  2 carb-levo, 1 trihexyphenidrol, 1 ropinerole 2mg ER  9am  2 carb-levo, 1 trihexyphenidrol  12noon  2 carb-levo, 1 trihexyphenidrol  3pm  1 carb-levo, 1 trihexyphenidrol, 1 ropinerole 2mg ER    This schedule avoids the terrible dyskinesia and the strong bradykinesia. I  have been able to reduce the use of the medical marijuana, not intentionally but because I just don't need it.  However, I do need a prescription for the 2mg ER ropinerole, 1 tablet two times a day. I am living in [place] now so could you please get the prescription at [facility] [address] [facility].  Thank you!

## 2018-06-12 NOTE — TELEPHONE ENCOUNTER
Tell the patient I forwarded the Requip ER 2 mg tablet, twice a day, to his pharmacy as requested.

## 2018-06-18 ENCOUNTER — TELEPHONE (OUTPATIENT)
Dept: NEUROLOGY | Facility: MEDICAL CENTER | Age: 78
End: 2018-06-18

## 2018-06-18 NOTE — LETTER
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 Thus she will require, on a lifelong basis, a side yard pickup of her trash containers. If there are any questions in regards to this matter, my office can be contacted.    Sincerely:        Jose Landin M.D.

## 2018-06-18 NOTE — TELEPHONE ENCOUNTER
----- Message from Jamil Gunn, Med Ass't sent at 6/18/2018  4:30 PM PDT -----  Regarding: FW: Non-Urgent Medical Question  Contact: [phone]  Pt needs a letter stating she needs side yard  due to her medical condition.  ----- Message -----  From: Kaleigh Tucker  Sent: 6/17/2018   8:55 AM  To: Neurology Mas  Subject: Non-Urgent Medical Question                      Because I am not able to push or pull my trash containers to the curb, [employer] will get the containers from my side yard, if I give them a note from my doctor. Thus, could you please just reply to the effect that I require I side yard pickup because of my physical condition.  Thank you,  Kaleigh Tucker

## 2018-07-06 ENCOUNTER — PATIENT MESSAGE (OUTPATIENT)
Dept: NEUROLOGY | Facility: MEDICAL CENTER | Age: 78
End: 2018-07-06

## 2018-07-06 DIAGNOSIS — G20.A1 PARKINSON'S DISEASE: ICD-10-CM

## 2018-07-23 ENCOUNTER — TELEPHONE (OUTPATIENT)
Dept: NEUROLOGY | Facility: MEDICAL CENTER | Age: 78
End: 2018-07-23

## 2018-07-23 DIAGNOSIS — G20.A1 PARKINSON'S DISEASE: ICD-10-CM

## 2018-07-23 NOTE — TELEPHONE ENCOUNTER
----- Message from Jamil Gunn Med Ass't sent at 7/23/2018  8:23 AM PDT -----  Regarding: FW: Non-Urgent Medical Question  Contact: [phone]      ----- Message -----  From: Kaleigh Tucker  Sent: 7/20/2018   4:49 PM  To: Jamil Gunn, Med Ass't  Subject: Non-Urgent Medical Question                      Jamil Mac,  I sent a message to Theresa Pizarro in which I told her that I don't want, or particularly need, a one-hour evaluation with an occupational therapist. What I need is someone to come to my house and show me how to safely attain my ADLs. Abbe Vo of [facility], recommended [facility] because they do have someone who will do what I need. Can you get a referral for me to have the occupational therapist come to my house to help me learn how to cope with the day-to-day activities that will improve my quality of life.  Thank you so much!  Kaleigh Tucker    ----- Message -----  From: Jamil Gunn Med Ass't  Sent: 7/10/18, 10:15 AM  To: Kaleigh Tucker  Subject: RE: Non-Urgent Medical Question    A referral for occupational therapy has been placed. They should be contacting you soon to schedule an appointment.     Thank you.     ----- Message -----     From: Kaleigh Tucker     Sent: 7/6/2018 12:04 PM PDT       To: Jose Landin M.D.  Subject: Non-Urgent Medical Question    Estefania, can Dr. Landin refer me to an occupational therapist? In my new home, I'm having a hard time getting around. Basically, I can manage only in a wheelchair. I know that it should be easier but I'm not sure how. An occupational therapist will be able to give me help with my functioning.  Thank you,  Kaleigh Tucker

## 2018-07-26 ENCOUNTER — TELEPHONE (OUTPATIENT)
Dept: NEUROLOGY | Facility: MEDICAL CENTER | Age: 78
End: 2018-07-26

## 2018-07-27 NOTE — TELEPHONE ENCOUNTER
----- Message from Jamil VEGA Luis A Med Ass't sent at 7/26/2018  8:54 AM PDT -----  Regarding: FW: RE: Non-Urgent Medical Question  Contact: [phone]      ----- Message -----  From: Kaleigh Tucker  Sent: 7/26/2018   8:44 AM  To: Jamil VEGA Luis A Med Ass't  Subject: RE: RE: Non-Urgent Medical Question              Que Negron,    The appointment for occupational therapy is a one-hour evaluation. I don't need that. What I need is a referral to have an occupational therapist come to my house and show me how to accomplish the ADLs that I can't do. I moved to [place] and need help in getting around in my new home. A referral to [facility] is exactly what I need. Someone from there will come to show me how to function, so I won't fall or do something else that would cause me to get hurt.    The ropinerole that Dr Landin prescribed is 2mg ER. The copay is extremely high. The pharmacy gave me ropinerole 2mg, but not ER, because of the high copay. You were going to see if a lower copay would be available. So, I don't have what Dr Landin prescribed for me.    Thank you for your help.  Kaleigh      ----- Message -----  From: Jamil Gunn Med Ass't  Sent: 7/26/18, 8:32 AM  To: Kaleigh Tucker  Subject: RE: Non-Urgent Medical Question    Que Farris-    It appears that you are scheduled for an occupational health appointment on August 13 at 8:00am.     Your Ropinerole was approved by the insurance thru the end of the year on 12/21/17. Have you had additional issues picking up this medication?    ----- Message -----     From: Kaleigh Tucker     Sent: 7/25/2018  2:06 PM PDT       To: Jose Landin M.D.  Subject: Non-Urgent Medical Question    Anything about the ropinerole 2mg ER?  Anything about the referral for the occupational therapist   .    Thank you!

## 2018-07-27 NOTE — TELEPHONE ENCOUNTER
Since there is not a cheaper co-pay for the ropinirole ER formulation, I want her to use the ropinirole IR 2 mg tablets she has, taken in the morning and then at midday. As for the occupational therapist coming to her house to help her work on her own personal ADLs, etc., I don't know how to write a consult for this specifically, I can only assume the initial evaluation will determine how a therapist can be helpful.

## 2018-08-08 ENCOUNTER — TELEPHONE (OUTPATIENT)
Dept: NEUROLOGY | Facility: MEDICAL CENTER | Age: 78
End: 2018-08-08

## 2018-08-08 DIAGNOSIS — G20.A1 PARKINSON'S DISEASE: ICD-10-CM

## 2018-08-08 NOTE — TELEPHONE ENCOUNTER
----- Message from Jamil Gunn Med Ass't sent at 8/8/2018  8:40 AM PDT -----  Regarding: FW: RE: Rx question  Contact: [phone]  Can you put in a referral for her below concerns? Occupation therapy was not what she was needing. Please advise.   ----- Message -----  From: Kaleigh Tucker  Sent: 8/8/2018   8:25 AM  To: Jamil Gunn Med Ass't  Subject: RE: RE: Rx question                              Que Negron,    I had an appointment at [facility] with an occupational therapist who would spend the hour-long appointment giving me tests. That is not what I need. The results of the tests will not tell me where, for example, I should put safety rails in my new home, or tell me that the floor rug in that room is a safety hazard, and increases the chance I will fall.    That's what I need, and what I am asking for.    Please help.    Thank you   Kaleigh  ----- Message -----  From: Jamil Gunn Med Ass't  Sent: 8/8/18, 8:14 AM  To: Kaleigh Tucker  Subject: RE: Rx question    Que Farris-    As far as I can see, Dr. Landin placed the order for occupational therapy last month and it says they are ready to schedule but haven't been able to get a hold of you. I will leave their information below and then you can ask them about going to your house.     Thank you.     [facility]  [address], [address] [place] [address]  [id number]  Phone:  [phone]    ----- Message -----     From: Kaleigh Tucker     Sent: 8/8/2018  7:37 AM PDT       To: Jamil Gunn Med Ass't  Subject: RE: Rx question    Sorry! My hand accidentally hit send rather than save, before I finished.    Abbe Vo said to call Sean William, the home health specialist at [facility], at [phone] and tell her that he said to call her about occupational therapy.    Thank you for any and all help.    Kaleigh Tucker      ----- Message -----  From: Jamil M Luis A, Med Ass't  Sent: 7/30/18, 10:00 AM  To: Kaleigh Tucker  Subject: Rx question    Que Chacko Dr.  "Mushtaq:    \"Since there is not a cheaper co-pay for the ropinirole ER formulation, I want her to use the ropinirole IR 2 mg tablets she has, taken in the morning and then at midday. As for the occupational therapist coming to her house to help her work on her own personal ADLs, etc., I don't know how to write a consult for this specifically, I can only assume the initial evaluation will determine how a therapist can be helpful.\"    I was unable to get the insurance to approve for a lower copay, so please follow the directions Dr. Landin has left for you and the immediate release ropinerole.    Thank you.    "

## 2018-08-09 ENCOUNTER — HOME HEALTH ADMISSION (OUTPATIENT)
Dept: HOME HEALTH SERVICES | Facility: HOME HEALTHCARE | Age: 78
End: 2018-08-09
Payer: MEDICARE

## 2018-08-13 ENCOUNTER — APPOINTMENT (OUTPATIENT)
Dept: OCCUPATIONAL THERAPY | Facility: REHABILITATION | Age: 78
End: 2018-08-13
Attending: PSYCHIATRY & NEUROLOGY
Payer: MEDICARE

## 2018-10-02 ENCOUNTER — OFFICE VISIT (OUTPATIENT)
Dept: NEUROLOGY | Facility: MEDICAL CENTER | Age: 78
End: 2018-10-02
Payer: MEDICARE

## 2018-10-02 VITALS
RESPIRATION RATE: 17 BRPM | TEMPERATURE: 98.1 F | BODY MASS INDEX: 17.05 KG/M2 | HEIGHT: 66 IN | DIASTOLIC BLOOD PRESSURE: 58 MMHG | HEART RATE: 122 BPM | OXYGEN SATURATION: 92 % | SYSTOLIC BLOOD PRESSURE: 98 MMHG | WEIGHT: 106.1 LBS

## 2018-10-02 DIAGNOSIS — G20.A1 PARKINSON'S DISEASE: Primary | ICD-10-CM

## 2018-10-02 PROCEDURE — 99213 OFFICE O/P EST LOW 20 MIN: CPT | Performed by: PSYCHIATRY & NEUROLOGY

## 2018-10-02 RX ORDER — TRIHEXYPHENIDYL HYDROCHLORIDE 2 MG/1
TABLET ORAL
Qty: 360 TAB | Refills: 3 | Status: SHIPPED | OUTPATIENT
Start: 2018-10-02 | End: 2019-03-11 | Stop reason: SINTOL

## 2018-10-02 RX ORDER — ROPINIROLE 2 MG/1
2 TABLET, FILM COATED ORAL 2 TIMES DAILY
COMMUNITY
End: 2018-10-02 | Stop reason: SDUPTHER

## 2018-10-02 RX ORDER — ROPINIROLE 2 MG/1
2 TABLET, FILM COATED ORAL 2 TIMES DAILY
Qty: 60 TAB | Refills: 6 | Status: SHIPPED | OUTPATIENT
Start: 2018-10-02 | End: 2019-03-11 | Stop reason: SDUPTHER

## 2018-10-02 ASSESSMENT — ENCOUNTER SYMPTOMS
MEMORY LOSS: 0
DEPRESSION: 0
FALLS: 0
CONSTIPATION: 0
TREMORS: 1

## 2018-10-02 ASSESSMENT — PATIENT HEALTH QUESTIONNAIRE - PHQ9: CLINICAL INTERPRETATION OF PHQ2 SCORE: 0

## 2018-10-02 ASSESSMENT — PAIN SCALES - GENERAL: PAINLEVEL: 6=MODERATE PAIN

## 2018-10-02 NOTE — PROGRESS NOTES
"Subjective:      Kaleigh Tucker is a 77 y.o. female who presents for follow-up with a history of Parkinson's disease.    HPI    The dyskinesias remain most problematic, though evidently things have gotten a little bit better with the simplification of her regimen. Though I wanted Requip ER 2 mg, extended release formulation proved too expensive, she is now on Requip IR 2 mg, twice a day. The end-of dose phenomena actually has improved with this, the day-to-day fluctuations as well seem to be a little bit better. She is also involved with water aerobics, getting physical therapy on a regular basis and has occupational therapy involved setting her home up.    She still remains on her Sinemet 25/100, #2 tablets 4 times a day, Artane 2 mg 4 times a day with each of the Sinemet dose. She sleeps well, denies hallucinations or vivid nightmares. Appetite and weight are stable. Still, the dyskinesias occur throughout the day, though they are better at nighttime with her present regimen.    Cognition remains intact, there have been no major issues with constipation or urinary retention.    Medical, surgical and family histories are reviewed in electronic health record, there are no new drug allergies. She is on Requip IR 2 mg, twice a day, Sinemet 25/100, #2 tablets 4 times a day with Artane 2 mg, 4 times a day, and vitamin D supplement daily.    Review of Systems   Constitutional: Positive for malaise/fatigue.   Cardiovascular: Negative for leg swelling.   Gastrointestinal: Negative for constipation.   Genitourinary: Negative for dysuria.   Musculoskeletal: Negative for falls.   Neurological: Positive for tremors.   Psychiatric/Behavioral: Negative for depression and memory loss.   All other systems reviewed and are negative.       Objective:     BP (!) 98/58 (BP Location: Right arm, Patient Position: Sitting)   Pulse (!) 122   Temp 36.7 °C (98.1 °F) (Temporal)   Resp 17   Ht 1.676 m (5' 6\")   Wt 48.1 kg (106 lb 1.6 oz)   " SpO2 92%   BMI 17.12 kg/m²      Physical Exam    She appears in no acute distress, as always in her wheelchair, she is quite cooperative. Vital signs are stable though her pulse is a little high at 122, but her rhythm is regular. There is no malar rash or sialorrhea. The neck is supple, range of motion is full. Cardiac evaluation reveals a regular rhythm.    She is fully oriented, there is no aphasia or apraxia. PERRLA/EOMI, visual fields are full, there is only slight hypophonia, no tachyphemia, minimal facial bradykinesia. Voice volume and clarity are maintained. There is no sensory loss to temperature. The tongue and uvula are midline.    Musculoskeletal exam again reveals the dyskinetic movements which are more apparent with action, they improve when she is more relaxed. There are little bit better overall. Rigidity is minimal, there is only tremor to a slight degree with the upper extremities at rest. Strength is grossly intact otherwise. Coordination reveals no appendicular dystaxia. Repetitive movements in all 4 extremities again are diminished in amplitude.    Gait is still curtailed, she can walk but requires assistance. Stride length is diminished.     Assessment/Plan:     1. Parkinson's disease (HCC)  For now we will simply continue her present regimen, she seemed quite happy. Occupational therapy referral will be made for [facility], already providing good daily cares for her. Safety bars, other home DME issues are being resolved. We will follow-up in 6 months.    - carbidopa-levodopa (SINEMET)  MG Tab; 2 tab at 6AM, 9AM and 12PM, 1 tab at 3PM  Dispense: 210 Tab; Refill: 6  - trihexyphenidyl (ARTANE) 2 MG Tab; TAKE ONE TABLET BY MOUTH FOUR TIMES DAILY. @5:30AM, 8:30AM, 11:30PM, AND 2:30PM  Dispense: 360 Tab; Refill: 3  - ROPINIRole (REQUIP) 2 MG tablet; Take 1 Tab by mouth 2 Times a Day.  Dispense: 60 Tab; Refill: 6  - REFERRAL TO OCCUPATIONAL THERAPY Reason for Therapy:  Eval/Treat/Report    Time: 20 minutes is spent face-to-face for exam, review, discussion, and education, of this over 60% of time spent counseling and coordinate care.

## 2018-10-10 ENCOUNTER — TELEPHONE (OUTPATIENT)
Dept: NEUROLOGY | Facility: MEDICAL CENTER | Age: 78
End: 2018-10-10

## 2018-10-19 ENCOUNTER — TELEPHONE (OUTPATIENT)
Dept: NEUROLOGY | Facility: MEDICAL CENTER | Age: 78
End: 2018-10-19

## 2018-10-19 DIAGNOSIS — G20.A1 PARKINSON'S DISEASE: ICD-10-CM

## 2018-10-19 NOTE — TELEPHONE ENCOUNTER
I placed a referral specifically for [facility]. Please printed out and forward/fax to the number provided by the patient.

## 2018-10-19 NOTE — TELEPHONE ENCOUNTER
----- Message from Jamil Gunn, Med Ass't sent at 10/19/2018  8:34 AM PDT -----  Regarding: FW: Non-Urgent Medical Question  Contact: [phone]      ----- Message -----  From: Kaleigh Tucker  Sent: 10/12/2018   2:54 PM  To: Neurology Mas  Subject: Non-Urgent Medical Question                      Hi  My daughter made some phone calls for me, I can pay as a private party to have an occupational therapist from [facility] come to my house. This is the easiest most expeditious approach to get something done that I have very much wanted.  However [facility] still needs dr Landin to send them a referral for the home care/visit.  I will be paying out of pocket, my insurance does not need to be involved but I do need dr Landin to send the referral to [facility]. Their fax number is [phone].  Can you please let me know if this is something dr Landin will do.  Thank you

## 2019-03-11 ENCOUNTER — OFFICE VISIT (OUTPATIENT)
Dept: NEUROLOGY | Facility: MEDICAL CENTER | Age: 79
End: 2019-03-11
Payer: MEDICARE

## 2019-03-11 VITALS
DIASTOLIC BLOOD PRESSURE: 62 MMHG | BODY MASS INDEX: 17.19 KG/M2 | OXYGEN SATURATION: 95 % | HEART RATE: 83 BPM | SYSTOLIC BLOOD PRESSURE: 118 MMHG | TEMPERATURE: 97.3 F | HEIGHT: 66 IN | RESPIRATION RATE: 16 BRPM | WEIGHT: 107 LBS

## 2019-03-11 DIAGNOSIS — G20.A1 PARKINSON'S DISEASE: Primary | ICD-10-CM

## 2019-03-11 PROCEDURE — 99213 OFFICE O/P EST LOW 20 MIN: CPT | Performed by: PSYCHIATRY & NEUROLOGY

## 2019-03-11 RX ORDER — ROPINIROLE 2 MG/1
2 TABLET, FILM COATED ORAL 2 TIMES DAILY
Qty: 60 TAB | Refills: 6 | Status: SHIPPED | OUTPATIENT
Start: 2019-03-11 | End: 2019-06-28 | Stop reason: CLARIF

## 2019-03-11 ASSESSMENT — ENCOUNTER SYMPTOMS
SENSORY CHANGE: 0
FALLS: 0
MEMORY LOSS: 0
TREMORS: 0
TINGLING: 0
LOSS OF CONSCIOUSNESS: 0
DEPRESSION: 0

## 2019-03-11 ASSESSMENT — PATIENT HEALTH QUESTIONNAIRE - PHQ9: CLINICAL INTERPRETATION OF PHQ2 SCORE: 0

## 2019-03-11 NOTE — PROGRESS NOTES
Subjective:      Kaleigh Tucker is a 78 y.o. female who presents with 1 of her home health providers, for follow-up, with a history of moderate Parkinson's disease.     HPI    Since last seen, Artane had become unavailable to her, her pharmacy had not been able to procure the drug for her.  Off the drug for the last month, she is actually done better.  The dyskinetic movements have improved, she is still moving as easily as before though this is limited.    In general, she still has the greatest difficulty when standing and trying to move.  She freezes easily and it is very difficult for her to start moving.  She is for the most part using her wheelchair even around her home, though she can move short distances using her cane and the backs of chairs.  She has not fallen with any regularity.  The incoordination with the hands has improved with resolution of the dyskinesias, tremor is minimally problematic.  She still swallows easily, does not drool, appetite has been stable.  She still can cook her own meals, does require some assistance with her ADLs, but for the most part is doing incredibly well still living at home alone.    Constipation is not an issue, nor is urinary retention.  Cognition is intact, she denies symptoms of severe anxiety or depression.  She sleeps well, she does have to get up frequently to go to the bathroom but falls back to sleep easily.  Daytime drowsiness is nonproblematic.  There have been no problems with hallucinations towards the end of the day.  The medications she takes do wear off within 3 hours, she has maintained her Sinemet and Requip regimen otherwise unchanged.  The peak-dose dyskinesias are less apparent.    Medical, surgical and family histories are reviewed in the electronic health record, there are no new drug allergies.  She is on Sinemet 25/100, 2 tablets taken at 5:30 AM, 8:30 AM, and 11:30 AM, 1 tablet at 2:30 PM, Requip IR 2 mg taken at 5:30 AM and 3 PM.  She is also on her  "co-Q10 and vitamin D supplement.    Review of Systems   Constitutional: Negative for malaise/fatigue.   Musculoskeletal: Negative for falls.   Neurological: Negative for tingling, tremors, sensory change and loss of consciousness.   Psychiatric/Behavioral: Negative for depression and memory loss.   All other systems reviewed and are negative.       Objective:     /62 (BP Location: Left arm, Patient Position: Sitting)   Pulse 83   Temp 36.3 °C (97.3 °F)   Resp 16   Ht 1.676 m (5' 6\")   Wt 48.5 kg (107 lb)   SpO2 95%   BMI 17.27 kg/m²      Physical Exam    She appears in no acute distress.  Her vital signs are stable.  She is quite pleasant and spirit and demeanor.  There is no malar rash or sialorrhea.  The neck is supple, range of motion is full.  Cardiac evaluation reveals a regular rhythm.    She is fully oriented, there is no aphasia or inattention.  The right pupil is still more dilated than the left, sluggishly reactive on direct stimulation, consensual response is intact.  Finger counting is intact in both eyes on confrontation.  Facial movements are symmetric, there are some dyskinetic movements with the head.  There is no sensory loss.    Overall, the dyskinetic movements are better, they seem to increase when she is active and using the involved extremity.  They do improve more noticeably when she is more rested.  She moves all extremities with symmetry.  There is mild rigidity bilaterally.    She stands with the need of assistance, gait initiation is still severely curtailed, stride length is diminished.  Amplitudes are diminished in the feet with fine motor control and repetitive movements.  To a lesser degree this occurs with the hands.  There is no appendicular dystaxia.     Assessment/Plan:     1. Parkinson's disease (HCC)  We will maintain her present regimen unchanged.  I suspect the improvement with the dyskinesias has simply to do with getting her off Artane.  Thus we will stay the " present regimen of Sinemet and Requip only.  She has a 3-hour duration of effect with the Requip, and though I would like to get her on an extended release formulation of the drug, the expense had proved prohibitive in the past.  She is comfortable with this present regimen.  We will follow-up in 6 months.    - ROPINIRole (REQUIP) 2 MG tablet; Take 1 Tab by mouth 2 Times a Day. Take at 5:30AM and 3PM  Dispense: 60 Tab; Refill: 6  - carbidopa-levodopa (SINEMET)  MG Tab; 2 tab at 5:30AM, 8:30AM and 11:30PM, 1 tab at 2:30PM  Dispense: 210 Tab; Refill: 6    Time: 20 minutes spent face-to-face for exam, review, discussion, and education, of this over 50% of the time spent face-to-face counseling and coordinating care surrounding all of the above issues.

## 2019-04-09 ENCOUNTER — TELEPHONE (OUTPATIENT)
Dept: NEUROLOGY | Facility: MEDICAL CENTER | Age: 79
End: 2019-04-09

## 2019-04-09 NOTE — TELEPHONE ENCOUNTER
"Patient states her medication is \"paralyzing\" her and she is unable to move when she takes her 08:30 dose of Sinemet as well as her  14:30 dose. This has been going on for the last few days. [employer] message was forwarded as well. Please advise.     [phone] Mae, daughter.  "

## 2019-04-15 ENCOUNTER — OFFICE VISIT (OUTPATIENT)
Dept: NEUROLOGY | Facility: MEDICAL CENTER | Age: 79
End: 2019-04-15
Payer: MEDICARE

## 2019-04-15 VITALS
HEART RATE: 84 BPM | OXYGEN SATURATION: 95 % | TEMPERATURE: 99 F | SYSTOLIC BLOOD PRESSURE: 90 MMHG | BODY MASS INDEX: 16.88 KG/M2 | DIASTOLIC BLOOD PRESSURE: 60 MMHG | WEIGHT: 105 LBS | HEIGHT: 66 IN

## 2019-04-15 DIAGNOSIS — G20.A1 PARKINSON DISEASE: ICD-10-CM

## 2019-04-15 PROCEDURE — 99213 OFFICE O/P EST LOW 20 MIN: CPT

## 2019-04-15 RX ORDER — ROPINIROLE 1 MG/1
1 TABLET, FILM COATED ORAL DAILY
Qty: 90 TAB | Refills: 3 | Status: SHIPPED | OUTPATIENT
Start: 2019-04-15 | End: 2019-06-28

## 2019-04-15 NOTE — PROGRESS NOTES
"Follow up   Kaleigh Tucker is a 78 y.o. female who presents for follow-up with a history of Parkinson's disease. She follows with dr Landin however he had no openings in her schedule and she needed to be see urgently.    HPI    The dyskinesias are persistent however she is worried more about \" not getting enough medications\". She is  on Requip 2 mg bid (5 30 am and 2 30 pm)  and Sinemet  95 30 am, 8 30 am, 11 30 am and 2 30 pm and 9 am and then 2 am again). Has \" freezing\" and return of stiffness and inability to move for about 30 minutes-1h after she takes her 11 30 am dose of Sinemet.  Her day-to-day fluctuations remain. She is  involved with water aerobics, getting physical therapy on a regular basis and has occupational therapy.     She still remains on her Sinemet 25/100, #2 tablets 4 times a day, Requip 2 mg bid, she is off Artane  She sleeps well, denies hallucinations or vivid nightmares. Appetite and weight are stable. Still, the dyskinesias occur throughout the day and she is currently experiencing them all day long.      5 30 am sinemet   8 30  11 30   2 30 pm   She takes Sinemet and ropinirole at 2 30 and 5 minutes after she gets freezing.    2 mg 5 30 am  ropinirole   2 30 pm ropinirole     Has freezing around 2 30 pm    Had 8 30 freezing as soon as she takes the pill within 5 minutes.    3 weeks ago this had started .Used to have freezing around 12 pm now only once a day at 2 30 pm.     Diagnosed in January of 2005 with PD. She got off artane 2 months ago due to side effects.    Review of Systems   Constitutional: Negative for malaise/fatigue.   Musculoskeletal: no muscle pain   Neurological: Negative for tingling, tremors, sensory change and loss of consciousness.   Psychiatric/Behavioral: Negative for depression and memory loss.   All other systems reviewed and are negative.     Past Medical History:   Diagnosis Date   • Cancer (HCC)     melanoma on back, surgically removed in 1981. Basal cell on scalp " removed    • Kidney stone 2015   • Muscle disorder     Parkinsons   • Parkinson's disease (HCC)      Past Surgical History:   Procedure Laterality Date   • CYSTOSCOPY Right 2015    Procedure: CYSTOSCOPY;  Surgeon: Smith Bennett M.D.;  Location: SURGERY [facility];  Service:    • STENT PLACEMENT  2015    Procedure: STENT PLACEMENT;  Surgeon: Smith Bennett M.D.;  Location: SURGERY [facility];  Service:    • URETEROSCOPY Right 2015    Procedure: URETEROSCOPY;  Surgeon: Smith Bennett M.D.;  Location: SURGERY [facility];  Service:    • APPENDECTOMY     • KNEE REPLACEMENT, TOTAL       Family History   Problem Relation Age of Onset   • Heart Disease Mother         Heart attack/   • Hypertension Mother    • Hyperlipidemia Sister    • Heart Disease Brother    • Cancer Maternal Aunt         stomach cancer/   • Hypertension Maternal Aunt    • Heart Disease Maternal Grandmother    • Cancer Maternal Grandfather         throat cancer   • Hypertension Maternal Uncle      Social History     Social History   • Marital status:      Spouse name: N/A   • Number of children: N/A   • Years of education: N/A     Occupational History   • Not on file.     Social History Main Topics   • Smoking status: Never Smoker   • Smokeless tobacco: Never Used   • Alcohol use No   • Drug use: No   • Sexual activity: No     Other Topics Concern   • Not on file     Social History Narrative   • No narrative on file     No Known Allergies     Vitals:    04/15/19 0754   BP: (!) 90/60   Pulse: 84   Temp: 37.2 °C (99 °F)   SpO2: 95%     She appears in no acute distress.  Her vital signs are stable.  She is quite pleasant and spirit and demeanor.  There is no malar rash or sialorrhea.  The neck is supple, range of motion is full.  Cardiac evaluation reveals a regular rhythm.     She is fully oriented, there is no aphasia or inattention.  The right pupil is still more dilated than the left,  sluggishly reactive on direct stimulation, consensual response is intact.  Finger counting is intact in both eyes on confrontation.  Facial movements are symmetric, there are some dyskinetic movements with the head.  There is no sensory loss.     Dyskinesia is prominent and involves bilateral UE and LE.  She moves all extremities with symmetry. There is mild rigidity bilaterally.     She stands with the need of assistance, gait initiation is difficult short stride and unsteadiness is evident.  Amplitudes are diminished in the feet with fine motor control and repetitive movements.  To a lesser degree this occurs with the hands.     Plan   PD   Add 1 mg Requip at 11 30 am she will follow up with dr Landin. I told her that dopaminergic therapy is certain to increase dyskinesias and she is already experiencing this constinuously however she states she is mostly bothered bu these episodes of return of all her parkinsonian sx.  Given she takes Sinemet at 2 30 pm and 5-10 minutes afterwards she has freezing episode I think the dose is not lasting enough. Given she takes only sinemet at 11 30 am we will try and add 1 mg requip at 11 30.  I warned her that this may not work and she is aware. She will call Dr Landin in case she experiences SE etc.    I told her dyskinesias are very prominent and any further increase in dopaminergic therapy may help freezing but will certainly increase dyskinesias.    Follow up with dr Mushtaq Rios MD PhD   Behavioral Neurologist   Board certified neurologist   [facility]     Patient was seen for 30 minutes face to face of which > 50% of appointment time was spent on counseling and coordination of care regarding the above.

## 2019-04-17 ENCOUNTER — TELEPHONE (OUTPATIENT)
Dept: NEUROLOGY | Facility: MEDICAL CENTER | Age: 79
End: 2019-04-17

## 2019-04-17 NOTE — TELEPHONE ENCOUNTER
Caregiver Minnie and pt called. Pt believes her Requip 1 mg has made her freezing episodes worse. It happens immediatly after taking the 1 mg dose.     She also forgot to mention that 2 weeks ago started feeling internal burning feeling that comes and goes. Could this be related to her parkinson's?    Pt prefers my chart message.

## 2019-04-17 NOTE — TELEPHONE ENCOUNTER
Patient follows with dr Landin and will have an appointment with him and discuss these issues.Thanks.

## 2019-06-28 ENCOUNTER — OFFICE VISIT (OUTPATIENT)
Dept: NEUROLOGY | Facility: MEDICAL CENTER | Age: 79
End: 2019-06-28
Payer: MEDICARE

## 2019-06-28 VITALS — TEMPERATURE: 98.8 F | HEART RATE: 80 BPM | DIASTOLIC BLOOD PRESSURE: 60 MMHG | SYSTOLIC BLOOD PRESSURE: 110 MMHG

## 2019-06-28 DIAGNOSIS — G20.A1 PARKINSON'S DISEASE: Primary | ICD-10-CM

## 2019-06-28 PROCEDURE — 99213 OFFICE O/P EST LOW 20 MIN: CPT | Performed by: PSYCHIATRY & NEUROLOGY

## 2019-06-28 RX ORDER — ROPINIROLE 4 MG/1
1 TABLET, FILM COATED, EXTENDED RELEASE ORAL
Qty: 30 TAB | Refills: 4 | Status: SHIPPED | OUTPATIENT
Start: 2019-06-28 | End: 2019-07-12 | Stop reason: CLARIF

## 2019-06-28 ASSESSMENT — ENCOUNTER SYMPTOMS
FOCAL WEAKNESS: 1
DEPRESSION: 0
TREMORS: 1
MEMORY LOSS: 1
FALLS: 0
CONSTIPATION: 0

## 2019-06-28 NOTE — PROGRESS NOTES
Subjective:      Kaleigh Tucker is a 78 y.o. female who presents with her niece Melody, for follow-up, with a history of moderate Parkinson's disease.    HPI    Last seen in the clinic by my associate Dr. Rahman, she had been complaining of increasing dyskinesias and also dystonia.  The latter symptom seems to be more random, the patient believes it is more prominent in the afternoons, but it can last for hours at a time.  He has never been clear whether it improves when she takes a dose of Sinemet or her ropinirole.  Though it is not a daily happening, the frequency is increasing.  The dyskinesias are still problematic at best, she feels she is always in a continuous state of motion.  These are clearly worsened with a peak-dose effect from her Sinemet and ropinirole.    When last seen, though an additional 1 mg tablet of ropinirole was recommended, she never actually took this.  Instead she increased her Sinemet 25/100 dosing, now 2 tablets 4 times a day taken at 5:30 AM, 8:30 AM, 11:30 AM and 2:30 PM.  The ropinirole 2 mg tablets are taken with the 5:30 AM dose and on its own at 5:30 PM.    She still can walk at home, though she does so cautiously.  She uses a wheelchair much more often.  Appetite and weight have been stable though because of the dystonia and the fluctuations, it is difficult to swallow food, she has lost a few more pounds.  She sleeps adequately, there have been no issues with daytime drowsiness, hallucinations, vivid dreams or nightmares, or impulsivity.  Fortunately she has not fallen with any kind of regularity.    Medical, surgical and family histories are reviewed, there are no new drug allergies.  She is on ropinirole IR 2 mg twice daily and Sinemet 25/100 as above, also vitamin D, turmeric and coenzyme Q 10 supplements.  She is engaged in water aerobics on a regular basis, she finds that this is still quite effective though again efficacy is for the short-term only.    Review of Systems    Gastrointestinal: Negative for constipation.   Musculoskeletal: Negative for falls.   Neurological: Positive for tremors and focal weakness.   Psychiatric/Behavioral: Positive for memory loss. Negative for depression.   All other systems reviewed and are negative.       Objective:     /60 (BP Location: Left arm, Patient Position: Sitting, BP Cuff Size: Adult)   Pulse 80   Temp 37.1 °C (98.8 °F) (Temporal)      Physical Exam    As always she appears in no acute distress, she does look far too thin.  She is in a continuous state of movement involving the head, extremities and axial musculatures.  Her vital signs are stable.  She is cooperative.  There is no malar rash or sialorrhea.  The neck is supple, range of motion is full.  Cardiac evaluation is unremarkable.    She is fully oriented, there is no aphasia.  PERRLA/EOMI, there is mild hypophonia but no dysarthria.  Facial movements are symmetric, there is no sensory loss of light touch.    Musculoskeletal exam again reveals the dyskinesias described above, there is no tremor, rigidity and bradykinesia are slight.  Strength is intact.    She does stand quickly out of the wheelchair on her own, but stride length is diminished.  There is still significant postural instability.  There is no appendicular dystaxia.  There is no dystonic posturing.     Assessment/Plan:     1. Parkinson's disease (HCC)  She is on far too much dopamine at this time, and with a pulsatile quality to treatment.  The dystonia is always a difficult side effect to work with, either as a peak-dose process, or wearing-off.  A long-acting formulation of amantadine, Gocovri, has been approved for the dyskinesias that can be part of this illness, but before we go there, I would like to adjust the dopamine she is already on.    She was told flat out that adjusting the medications now might entail a slight worsening of her parkinsonian motor symptoms initially.  If she wants to get rid of the  dyskinesias to any degree, dopamine will have to be adjusted down.  The dystonia could improve with less dopamine, but if it worsens, we will be limited.  She understands the balancing act.    Requip ER 4 mg will be taken once in the morning, hopefully this gives a smoother blood level throughout the whole day, reducing the dyskinesias without worsening the dystonia.  Depending on the response, Sinemet dosing will then be diminished.  Phone contact in the interim, we will follow-up in 6 months.    - Ropinirole HCl 4 MG TABLET SR 24 HR; Take 1 Tab by mouth every day.  Dispense: 30 Tab; Refill: 4    Time: 20 minutes spent face-to-face for exam, review, discussion, and education, of this over 50% of the time spent counseling and coordinating care surrounding all the above issues.

## 2019-07-12 ENCOUNTER — TELEPHONE (OUTPATIENT)
Dept: NEUROLOGY | Facility: MEDICAL CENTER | Age: 79
End: 2019-07-12

## 2019-07-12 DIAGNOSIS — G20.A1 PARKINSON'S DISEASE: ICD-10-CM

## 2019-07-12 RX ORDER — ROPINIROLE 2 MG/1
2 TABLET, FILM COATED ORAL 2 TIMES DAILY
Qty: 60 TAB | Refills: 6 | Status: SHIPPED | OUTPATIENT
Start: 2019-07-12 | End: 2019-09-24 | Stop reason: SDUPTHER

## 2019-07-12 NOTE — TELEPHONE ENCOUNTER
Let her know I did forward a higher amount of the Sinemet tablets, and changed her to the Requip IR 2 mg tablet taken twice a day as she had been on before.

## 2019-08-30 ENCOUNTER — TELEPHONE (OUTPATIENT)
Dept: NEUROLOGY | Facility: MEDICAL CENTER | Age: 79
End: 2019-08-30

## 2019-08-31 NOTE — TELEPHONE ENCOUNTER
"----- Message from Jamil Gunn, Med Ass't sent at 8/30/2019  6:25 PM PDT -----  Regarding: FW: Non-Urgent Medical Question  Contact: [phone]      ----- Message -----  From: Kaleigh Tucker  Sent: 8/23/2019   8:17 AM PDT  To: Neurology [facility]  Subject: Non-Urgent Medical Question                      To prepare for my upcoming appointment on September 13, I have three items that I want to get help with.    1. 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 Help!    2. Because of the frequency of the sinemet, I cannot eat as much as I want. I'm always hungry. But I am losing weight. At 5'8\", I am only 100 pounds. Help!    3. From about 2 pm, I am unable to function. My knees lock, my feet freeze, and I have constant dyskinesia. Help!    Thank you for your help!  "

## 2019-08-31 NOTE — TELEPHONE ENCOUNTER
"I am not sure what she means by \"whole body neuropathy\", and thus I do not know what exactly is happening with her sleep distortions.  This is not something we have discussed when I had seen her.  It might require a referral to a sleep specialist but again I cannot comment further.    As for the Sinemet issues and loss of appetite, again I do not understand what the issue is other than it may be difficult to swallow.  Find out exactly what her dosing regimen is of the Sinemet as well as the Requip ER.  She was told that as we adjust the Requip and Sinemet that her parkinsonian symptoms will increase, that sounds like what is happening.  Once I know what her dosing regimen is for both of these medicines, I can make an intelligent decision as to where to go.  "

## 2019-09-06 ENCOUNTER — TELEPHONE (OUTPATIENT)
Dept: NEUROLOGY | Facility: MEDICAL CENTER | Age: 79
End: 2019-09-06

## 2019-09-07 NOTE — TELEPHONE ENCOUNTER
"Regarding: RE: Non-Urgent Medical Question  ----- Message from Jamil Gunn Med Ass't sent at 9/6/2019  3:37 PM PDT -----       ----- Message sent from Jamil Gunn Med Ass't to Kaleigh Tucker at 9/6/2019  3:37 PM -----   Per Dr. Landin:  \"I am not sure what she means by \"whole body neuropathy\", and thus I do not know what exactly is happening with her sleep distortions.  This is not something we have discussed when I had seen her.  It might require a referral to a sleep specialist but again I cannot comment further.     As for the Sinemet issues and loss of appetite, again I do not understand what the issue is other than it may be difficult to swallow.  Find out exactly what her dosing regimen is of the Sinemet as well as the Requip ER.  She was told that as we adjust the Requip and Sinemet that her parkinsonian symptoms will increase, that sounds like what is happening.  Once I know what her dosing regimen is for both of these medicines, I can make an intelligent decision as to where to go.\"    ----- Message -----     From: Kaleigh Tucker     Sent: 8/23/2019  8:17 AM PDT       To: Jose Landin M.D.  Subject: Non-Urgent Medical Question    To prepare for my upcoming appointment on September 13, I have three items that I want to get help with.    1. 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 Help!    2. Because of the frequency of the sinemet, I cannot eat as much as I want. I'm always hungry. But I am losing weight. At 5'8\", I am only 100 pounds. Help!    3. From about 2 pm, I am unable to function. My knees lock, my feet freeze, and I have constant dyskinesia. Help!    Thank you for your help!  "

## 2019-09-11 ENCOUNTER — TELEPHONE (OUTPATIENT)
Dept: NEUROLOGY | Facility: MEDICAL CENTER | Age: 79
End: 2019-09-11

## 2019-09-12 NOTE — TELEPHONE ENCOUNTER
"----- Message from Jamil SILVIA Gunn, Med Ass't sent at 9/9/2019  4:56 PM PDT -----  Regarding: FW: Non-Urgent Medical Question  Contact: [phone]      ----- Message -----  From: Kaleigh Tucker  Sent: 9/6/2019   3:55 PM PDT  To: Jamil VEGA Luis A, Med Ass't  Subject: RE: Non-Urgent Medical Question                  I have neuropathy all over. It has nothing to do with sleep except that it hurts so much that I cannot sleep well. I have it during the day as well as at night.    I have not lost my appetite. I'm always hungry. But with the meds every three hours, I don't have enough time to eat as much as I want. Thus,I am losing weight. I have no difficulty swallowing food.    Right now,,  I'm taking ropinerole 2mg twice a day, when I get up at 5:30, and again at 5pm.  I take 2 sinemet, 25/100, at 5:30am, 8:30 am, 11:30am, 2:30pm, and 1 sinemet at 5pm. I don't wait until 5:30pm to take the last one because I am so hungry,     At 5'8\", I should weigh more than 100 pounds. If I could just have enough time between the pills, I could eat more.    Does the above help explain my problems?    Thank you,  Kaleigh Tucker       ----- Message -----  From: Jamil Gunn, Med Ass't  Sent: 9/6/19, 3:37 PM  To: Kaleigh Tucker  Subject: RE: Non-Urgent Medical Question    Per Dr. Landin:  \"I am not sure what she means by \"whole body neuropathy\", and thus I do not know what exactly is happening with her sleep distortions.  This is not something we have discussed when I had seen her.  It might require a referral to a sleep specialist but again I cannot comment further.     As for the Sinemet issues and loss of appetite, again I do not understand what the issue is other than it may be difficult to swallow.  Find out exactly what her dosing regimen is of the Sinemet as well as the Requip ER.  She was told that as we adjust the Requip and Sinemet that her parkinsonian symptoms will increase, that sounds like what is happening.  Once I know what her " "dosing regimen is for both of these medicines, I can make an intelligent decision as to where to go.\"    ----- Message -----     From: Kaleigh Tucker     Sent: 8/23/2019  8:17 AM PDT       To: Jose Landin M.D.  Subject: Non-Urgent Medical Question    To prepare for my upcoming appointment on September 13, I have three items that I want to get help with.    1. 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 Help!    2. Because of the frequency of the sinemet, I cannot eat as much as I want. I'm always hungry. But I am losing weight. At 5'8\", I am only 100 pounds. Help!    3. From about 2 pm, I am unable to function. My knees lock, my feet freeze, and I have constant dyskinesia. Help!    Thank you for your help!  "

## 2019-09-12 NOTE — TELEPHONE ENCOUNTER
"This patient does not have \"neuropathy\".  I do not know where she gets the language from, other than she may be describing her symptoms which may be \"neuropathic\" (i.e. tingling, numbness, etc.) in nature.  Please reassure her she does not have neuropathy.  She is also someone who has an immense amount of anxiety surrounding her illness, we have made very few changes in her medication regimen, but she suddenly seemed to have a huge amount of side effect and new symptoms evolving since she was last seen in the office.  She was told that adding more dopamine will make a lot of her symptoms worse, and she had continued to do that.  Her disease is moderate to severe, she was already having real issues getting any benefit from drugs that she is taking.    Since a lot of the symptoms were not nearly as severe before I had seen her back in April, until I see her, I can only recommend she get back on the original dosing of Sinemet 25/100, she takes 2 tablets 4 times a day only and the Requip 2 mg twice a day.  "

## 2019-09-24 ENCOUNTER — OFFICE VISIT (OUTPATIENT)
Dept: NEUROLOGY | Facility: MEDICAL CENTER | Age: 79
End: 2019-09-24
Payer: MEDICARE

## 2019-09-24 VITALS
TEMPERATURE: 96.8 F | OXYGEN SATURATION: 95 % | DIASTOLIC BLOOD PRESSURE: 90 MMHG | HEART RATE: 92 BPM | SYSTOLIC BLOOD PRESSURE: 140 MMHG

## 2019-09-24 DIAGNOSIS — G20.A1 PARKINSON'S DISEASE: Primary | ICD-10-CM

## 2019-09-24 DIAGNOSIS — R63.4 WEIGHT LOSS: ICD-10-CM

## 2019-09-24 PROCEDURE — 99213 OFFICE O/P EST LOW 20 MIN: CPT | Performed by: PSYCHIATRY & NEUROLOGY

## 2019-09-24 RX ORDER — ROPINIROLE 2 MG/1
2 TABLET, FILM COATED ORAL 2 TIMES DAILY
Qty: 60 TAB | Refills: 6 | Status: SHIPPED | OUTPATIENT
Start: 2019-09-24 | End: 2020-02-25 | Stop reason: SDUPTHER

## 2019-09-24 ASSESSMENT — ENCOUNTER SYMPTOMS
MEMORY LOSS: 0
TREMORS: 1
TINGLING: 1
FALLS: 1
SENSORY CHANGE: 1

## 2019-09-25 NOTE — PROGRESS NOTES
"Subjective:      Kaleigh Tucker is a 78 y.o. female who presents for follow-up, with a history of moderate Parkinson's disease, still dealing with significant dyskinesias and dystonia, now with weight loss.     HPI    Kaleigh states that her abnormal involuntary movements have increased, she still has difficulty getting around though she can do so using a walker at home.  She is not falling with regularity, she is just simply cautious.  We had attempted a trial of Requip IR 4 mg over the day, and then adjusting the Sinemet, unfortunately it provided no additional benefit.  She is back on Requip IR 2 mg taken twice daily at 5:30 AM and 5:30 PM.    She did increase the Sinemet further, instead of 2 tablets 3 times a day and 1 tablet in the early afternoon, she is now taking 2 tablets 4 times a day with an additional dose in the evening.  With all of this, the involuntary movements have continued.    She is now also describing \"a neuropathy\" involving all of her extremities.  She describes a sensitivity of the skin, at times she can put sheets over her feet as a result.  She denies analgesia or actually injuring her feet.  There is no weakness.  The symptoms may be worsened into the evening, they are present whether or not she is standing, walking or sitting down.  They have increased over the last couple of months as we have been adjusting her Sinemet and ropinirole regimen.    On top of this her anxiety continues to be problematic.  She denies depressed feelings.  She is concerned about her weight loss, she states that she is hungry, food still tastes good, she has difficulty adjusting her diet and food consumption relative to the frequency of dosing with her Sinemet.    Medical, surgical and family histories are reviewed in the electronic health record, there are no new drug allergies.  She is on Requip IR 2 mg taken twice a day at 5:30 AM and 5:30 PM, Sinemet 25/100, 2 tablets at 5:30 AM, 8:30 AM, 11:30 PM and 1 tablet at " 2:30 PM.    Review of Systems   Musculoskeletal: Positive for falls.   Neurological: Positive for tingling, tremors and sensory change.   Psychiatric/Behavioral: Negative for memory loss.   All other systems reviewed and are negative.       Objective:     /90 (BP Location: Left arm, Patient Position: Sitting, BP Cuff Size: Adult)   Pulse 92   Temp 36 °C (96.8 °F) (Temporal)   SpO2 95%      Physical Exam    She appears in no acute distress.  Vital signs are stable.  She looks ever more cachectic, far too thin for her body habitus.  There is no malar rash.  The neck is supple.  Cardiac evaluation is unremarkable.    She is fully oriented, there is no aphasia or inattention.  PERRLA/EOMI, there is no hypophonia or dysarthria, facial movements are symmetric, visual fields are grossly full.  The tongue and uvula are midline.  There are some oral buccal dyskinetic movements.    Musculoskeletal exam reveals near continuous dyskinetic movements in all 4 extremities, there is some mild dystonia in the lower extremities.  To a certain degree these can be controlled voluntarily.  Rigidity is present.  There is no tremor at rest.  Strength is 5/5 in all 4 extremities.  Reflexes are present throughout, none are dropped, even at the ankles.    She stands slowly, is very unsteady, there is noticeable hesitancy and gait initiation.  There is no appendicular dystaxia despite the dyskinesias.  Repetitive movements are dysrhythmic but amplitudes are only slightly diminished in the hands, more so in the feet.    Sensory exam is intact to vibration and temperature.     Assessment/Plan:     1. Parkinson's disease (HCC)  Given that a lot of the symptoms she is now experiencing were created as we adjusted her dopamine, I have always told her that too much dopamine is going to be a problem, I will simply get her back on to the original Sinemet regimen she had been on earlier this year before we had seen each other for the first  "time.  Thus, Requip IR 2 mg, twice daily will be continued, Sinemet 25/100 will go back to 2 tablets 3 times a day and 1 tablet in the early afternoon.  She was warned that things will slow down, I also expect that the dyskinesias will be helped.    I am not sure what this \"neuropathy\" is all about, she certainly has no risk, but she insists it has been there for quite a long time, seemingly predating her parkinsonian condition.  Regardless, I am going to get the parkinsonian-associated akathisia is out of the picture to get a better sense of what this neuropathic picture is about.  It may require EMG studies being done and depending on these, blood work, etc.    - Nutrition (Dietary) Consult  - carbidopa-levodopa (SINEMET)  MG Tab; 2 tab at 5:30AM, 8:30AM and 11:30PM, and 1 tab at 5:30PM  Dispense: 270 Tab; Refill: 5  - ROPINIRole (REQUIP) 2 MG tablet; Take 1 Tab by mouth 2 Times a Day. Take at 5:30AM and 5:30PM  Dispense: 60 Tab; Refill: 6    2. Weight loss  Also not a direct effect of the Parkinson's itself, I am going to forward her to a dietary specialist for nutritional consult.  We will follow-up in 6 months otherwise.    - Nutrition (Dietary) Consult    Time: 20-minute spent face-to-face for exam, review, discussion, and education, of this over 70% of the time spent counseling and coordinating care.  "

## 2019-09-30 ENCOUNTER — TELEPHONE (OUTPATIENT)
Dept: NEUROLOGY | Facility: MEDICAL CENTER | Age: 79
End: 2019-09-30

## 2019-10-01 NOTE — TELEPHONE ENCOUNTER
"The patient and I addressed the \"neuropathy\" symptoms when I had seen her last week.  I told her of my impressions as to what may be the cause, think it was simply too much dopamine, and this was why I am adjusting her dopamine down, putting her back on the dose that she had been on when she had first seen me before the symptoms suddenly worsened.  If she is in that much distress, she needs to get into an emergency room.  I am not comfortable simply throwing a nonspecific sleep aid in her direction.  If she has never tried melatonin, it is an OTC that is safe to take, she can start at 6 mg every evening.  "

## 2019-10-01 NOTE — TELEPHONE ENCOUNTER
----- Message from Hardeep Morse Ass't sent at 9/30/2019  2:16 PM PDT -----  Regarding: FW: Prescription Question  Contact: [phone]  Please advise. Thank you    ----- Message -----  From: Kaleigh Garrett  Sent: 9/27/2019   7:52 AM PDT  To: Neurology [facility]  Subject: Prescription Question                            Good morning.   If my burning skin is not neuropathy, I don't really care what it is. What I do care about is that it hurts so much that it keeps me awake and I'm averaging 3-4 interrupted hours of sleep each night. With the sinemet spread out to every 4 hours, I feel as though I am in a downward spiral with no chance of getting out. My body is not functioning because of the sinemet and my mind is not functioning because of the lack of sleep.     I've never taken a sleeping pill before but I feel that if I don't get some sleep soon, I might as well give up. I cannot function at all anymore.    Please help.    Thank you

## 2019-10-14 ENCOUNTER — TELEPHONE (OUTPATIENT)
Dept: NEUROLOGY | Facility: MEDICAL CENTER | Age: 79
End: 2019-10-14

## 2019-10-14 NOTE — TELEPHONE ENCOUNTER
----- Message from Hardeep Sanchez Ass't sent at 10/14/2019 10:22 AM PDT -----  Regarding: FW: Prescription Question  Contact: [phone]   Can you help me with this please,  Kala  ----- Message -----  From: Kaleigh Tucker  Sent: 10/13/2019   8:00 AM PDT  To: Neurology [facility]  Subject: Prescription Question                            Good morning     Can you please tell Dr Landin that I'm eating better and actually sleeping better.  However, something else is happening and it's scary.    For some reason, after my second scheduled pills, my whole body freezes. It's as if someone flips a switch and I cannot move. This happens when I am sitting in the wheelchair.  It lasts until sometime after the third scheduled pills, allowing me to fix and eat lunch. However, after the next scheduled pills, it happens again and nothing alleviates it, not more pills, food, rest, trying to move, etc.  Does Dr Landin know what is happening, is it temporary, is there something I can do to prevent it or to cancel it? Is it due to the weather?  If I have to endure this forever, it's impossible! Do I need different medication? Do I need to eat different food?   I'm sure I am not the only one to whom this has happened. Can Dr Landin help me?     Thank you,  Kaleigh Tucker     PS..I have not yet heard anything from the referral dietitian.

## 2019-10-18 NOTE — TELEPHONE ENCOUNTER
"Please tell the patient I am not clear what these episodes are all about.  It sounds as if she is almost \"frozen\", what is often called and on/off attack, but I would not expect it to happen immediately following a dose of dopamine.  Typically dopamine is used to relieve patients were having this kind of difficulty.  She has not had this problem when she was on this dose of dopamine in the past.  Thus, it is unclear whether or not this is short-lived and will stop.  To reduce her dopamine even further will simply make everything worse, she will be more stiff and rigid throughout the day, independent of a dose that she is taking.    If she wants, since this happens only following the second and the fourth, last dose of her Sinemet, she can add an additional Sinemet 25/100 tablet to each of these doses.  It does not happen following her first dose of Sinemet in the morning, this can stay as a 2 tablet dose.  "

## 2019-10-24 ENCOUNTER — TELEPHONE (OUTPATIENT)
Dept: NEUROLOGY | Facility: MEDICAL CENTER | Age: 79
End: 2019-10-24

## 2019-10-24 DIAGNOSIS — G20.A1 PARKINSON'S DISEASE: ICD-10-CM

## 2019-10-25 NOTE — TELEPHONE ENCOUNTER
----- Message from Hardeep Augustine Ass't sent at 10/23/2019  8:27 AM PDT -----  Regarding: FW: Non-Urgent Medical Question  Contact: [phone]  Please advise.    ----- Message -----  From: Kaleigh Tucker  Sent: 10/22/2019   9:36 AM PDT  To: Neurology [facility]  Subject: Non-Urgent Medical Question                      Hello,  My physical therapist thinks I should have an electric wheelchair. For Medicare to help pay for it, I need a referral for a wheelchair evaluation.  Can a prescription for a wheelchair evaluation be sent to [facility]? Thank you so much.  Kaleigh Tucker

## 2019-10-25 NOTE — TELEPHONE ENCOUNTER
I did submit a referral to physical therapy for wheelchair evaluation.  It is printed out, send it to [facility] where the patient is a resident.

## 2019-10-30 ENCOUNTER — TELEPHONE (OUTPATIENT)
Dept: NEUROLOGY | Facility: MEDICAL CENTER | Age: 79
End: 2019-10-30

## 2019-10-30 NOTE — TELEPHONE ENCOUNTER
I did submit a referral to physical therapy for wheelchair evaluation.  It is printed out, send it to [facility] where the patient is a resident.         Documentation       Jose Landin M.D. 6 days ago         ----- Message from Hardeep Augustine Ass't sent at 10/23/2019  8:27 AM PDT -----  Regarding: FW: Non-Urgent Medical Question  Contact: [phone]  Please advise.     ----- Message -----  From: Kaleigh Tucker  Sent: 10/22/2019   9:36 AM PDT  To: Neurology [facility]  Subject: Non-Urgent Medical Question                       Hello,  My physical therapist thinks I should have an electric wheelchair. For Medicare to help pay for it, I need a referral for a wheelchair evaluation.  Can a prescription for a wheelchair evaluation be sent to [facility]? Thank you so much.  Kaleigh Tucker               [facility] order to [facility] to [facility]  Phone Number [phone]  Fax: [phone]

## 2020-01-10 ENCOUNTER — TELEPHONE (OUTPATIENT)
Dept: NEUROLOGY | Facility: MEDICAL CENTER | Age: 80
End: 2020-01-10

## 2020-01-10 NOTE — TELEPHONE ENCOUNTER
----- Message from Kaleigh Tucker sent at 1/7/2020  3:29 PM PST -----  Regarding: Procedure Question  Contact: [phone]  I have attached a copy of an email that describes what I need for the electric wheelchair. I hope that by giving you the information now, it will be ready at my appointment on January 17 at 10am.  Thank you ,  Kaleigh Tucker

## 2020-01-10 NOTE — TELEPHONE ENCOUNTER
Dr. Landin,    I have printed out your last dictation note on 9/24/2019. For this patient.  She also needs an Physical Therapy Evaluation fir Mobility.  And most important an Rx stating 1 scoter and the ICD-10 code that pertains to the need of the scooter.    I have also put the paper on you desk.   If you have any question about this.   And if you still want to procced with this.

## 2020-01-11 NOTE — TELEPHONE ENCOUNTER
Doron Landin you wanted me to scan the things in and you stated that you would come up with something for this patient,  I have scanned the orders in the media tab for you for this patient to get her walker.  Thank you.

## 2020-01-17 ENCOUNTER — APPOINTMENT (OUTPATIENT)
Dept: NEUROLOGY | Facility: MEDICAL CENTER | Age: 80
End: 2020-01-17
Payer: MEDICARE

## 2020-02-05 ENCOUNTER — TELEPHONE (OUTPATIENT)
Dept: NEUROLOGY | Facility: MEDICAL CENTER | Age: 80
End: 2020-02-05

## 2020-02-05 NOTE — TELEPHONE ENCOUNTER
Rep. [employer] called about this patient needing a dictation note from last prior visits stating in the note that this patient needs a wheel chair to get around in her own home.

## 2020-02-10 ENCOUNTER — TELEPHONE (OUTPATIENT)
Dept: NEUROLOGY | Facility: MEDICAL CENTER | Age: 80
End: 2020-02-10

## 2020-02-10 NOTE — TELEPHONE ENCOUNTER
----- Message from Kaleigh Tucker sent at 2/9/2020  8:10 AM PST -----  Regarding: Prescription Question  Contact: [phone]  The last message from me pertained to major dyskinesia. I have discovered a few pills from an old prescription. They work!  Could you please fax to [facility] on [address] for some  Artane (trihexypHenidol hcl)   I take 4 a day.   I have an appointment with Dr Landin on March 12 so if I could have enough to get me through the days until I see him, I would appreciate it.  Can you please let me know when you do this so I can make arrangements for someone to get it for me.  Thank  you   Kaleigh Tucker

## 2020-02-10 NOTE — TELEPHONE ENCOUNTER
----- Message from Kaleigh Tucker sent at 2/6/2020  9:29 AM PST -----  Regarding: Prescription Question  Contact: [phone]  Something is drastically wrong with my carb/Levo.     I take the pills and I tremor uncontrollably for at least two hours. I hit my elbow during one of the episodes and now have cellulites, which I'm taken antibiotics for.     I need you to please fax a prescription to [facility] on [address]     If you can do this as soon as possible I would appreciate it and let me know when it's been sent so I can get it picked up    Thank you    Kaleigh Tucker

## 2020-02-11 DIAGNOSIS — G20.A1 PARKINSON'S DISEASE (HCC): ICD-10-CM

## 2020-02-11 NOTE — TELEPHONE ENCOUNTER
Find out from the patient how much Sinemet 25/100 she is taking.  She should have been on 2 tablets 3 times a day at 5:30 AM, 8:30 AM, and 11:30 PM, and a single tablet at 5:30 PM.  She should also be on Requip 2 mg twice a day, taken at 5:30 AM and 5:30 PM.  She has a very bad habit of self-medicating and adjusting her regimen despite recommendations to the contrary.  I cannot place her on Artane until I know exactly what she is doing.

## 2020-02-12 NOTE — TELEPHONE ENCOUNTER
Received request via: Pharmacy    Was the patient seen in the last year in this department? Yes    Does the patient have an active prescription (recently filled or refills available) for medication(s) requested? Yes.

## 2020-02-21 ENCOUNTER — TELEPHONE (OUTPATIENT)
Dept: NEUROLOGY | Facility: MEDICAL CENTER | Age: 80
End: 2020-02-21

## 2020-02-21 NOTE — TELEPHONE ENCOUNTER
----- Message from Kaleigh Garrett sent at 2/18/2020  8:39 AM PST -----  Regarding: RE: Prescription Question  Contact: [phone]  To which pharmacy?   [facility] does not have it  Thank you     ----- Message -----  From: Beau KINDRA Villatora, Med Ass't  Sent: 2/18/20, 8:18 AM  To: Kaleigh Tucker  Subject: RE: Prescription Question    Estefania Farris,    Dr. Landin had sent this in on 2/11/2020      ----- Message -----       From:Kaleigh Tucker       Sent:2/12/2020 11:04 AM PST         To:Bekobi ARGUELLES Villatora, Med Ass't    Subject:RE: Prescription Question    Is there a prescription at [facility] for me?      ----- Message -----       From:Beto Castroatora, Med Ass't       Sent:2/10/2020 10:34 AM PST         To:Kaleigh Tucker    Subject:RE: Prescription Question    I have forwarded him this message      ----- Message -----     From: Kaleigh Tucker     Sent: 2/9/2020  8:10 AM PST       To: Jose Landin M.D.  Subject: RE: Prescription Question    The last message from me pertained to major dyskinesia. I have discovered a few pills from an old prescription. They work!  Could you please fax to [facility] on [address] for some  Artane (trihexypHenidol hcl)   I take 4 a day.   I have an appointment with Dr Landin on March 12 so if I could have enough to get me through the days until I see him, I would appreciate it.  Can you please let me know when you do this so I can make arrangements for someone to get it for me.  Thank  you   Kaleighradha Tucker       - - - DISCLAIMER - - -  [email or web address][This message may contain formatting that cannot be shown on this site.]

## 2020-02-22 NOTE — TELEPHONE ENCOUNTER
Beto, please find out from Kaleigh what medications she is taking at this time, including the names, strength and frequencies of each.  I am not sure what dose of Artane she may have been on in the past, it may require phoning her pharmacy directly to find out the strength as well as frequency.

## 2020-02-24 ENCOUNTER — PATIENT MESSAGE (OUTPATIENT)
Dept: NEUROLOGY | Facility: MEDICAL CENTER | Age: 80
End: 2020-02-24

## 2020-02-24 DIAGNOSIS — G20.A1 PARKINSON'S DISEASE (HCC): ICD-10-CM

## 2020-02-24 RX ORDER — TRIHEXYPHENIDYL HYDROCHLORIDE 2 MG/1
TABLET ORAL
Qty: 360 TAB | Refills: 3 | Status: CANCELLED
Start: 2020-02-24

## 2020-02-24 NOTE — PATIENT COMMUNICATION
Called in patients:    ROPINIRole (REQUIP) 2 MG tablet 60 Tab 6/6 2/24/2020     Sig - Route: Take 1 Tab by mouth 2 Times a Day. Take at 5:30AM and 5:30PM - Oral

## 2020-02-25 RX ORDER — ROPINIROLE 2 MG/1
2 TABLET, FILM COATED ORAL 2 TIMES DAILY
Qty: 60 TAB | Refills: 6 | Status: SHIPPED | OUTPATIENT
Start: 2020-02-25 | End: 2020-09-11 | Stop reason: SDUPTHER

## 2020-03-12 ENCOUNTER — OFFICE VISIT (OUTPATIENT)
Dept: NEUROLOGY | Facility: MEDICAL CENTER | Age: 80
End: 2020-03-12
Payer: MEDICARE

## 2020-03-12 VITALS — HEART RATE: 68 BPM | TEMPERATURE: 97.3 F | RESPIRATION RATE: 15 BRPM | OXYGEN SATURATION: 97 %

## 2020-03-12 DIAGNOSIS — G20.A1 PARKINSON'S DISEASE (HCC): Primary | ICD-10-CM

## 2020-03-12 PROCEDURE — 99213 OFFICE O/P EST LOW 20 MIN: CPT | Performed by: PSYCHIATRY & NEUROLOGY

## 2020-03-12 RX ORDER — SAFINAMIDE MESYLATE 50 MG/1
1 TABLET, FILM COATED ORAL DAILY
Qty: 30 TAB | Refills: 1 | Status: SHIPPED | OUTPATIENT
Start: 2020-03-12 | End: 2020-03-30

## 2020-03-12 ASSESSMENT — ENCOUNTER SYMPTOMS
TREMORS: 1
FALLS: 0
DEPRESSION: 0
MEMORY LOSS: 0
TINGLING: 0

## 2020-03-12 ASSESSMENT — PATIENT HEALTH QUESTIONNAIRE - PHQ9: CLINICAL INTERPRETATION OF PHQ2 SCORE: 0

## 2020-03-13 NOTE — PROGRESS NOTES
Subjective:      Kaleigh Tucker is a 79 y.o. female who presents with her son Cali, for 3-month follow-up, with a history of moderate to severe Parkinson's disease, dealing with ever greater difficulties from dyskinesias.    HPI    When last seen, we had finally gotten her medication regimen controlled, on Requip 2 mg twice a day at 5:30 AM and 5:30 PM, Sinemet 25/100, 2 tablets at 5:30 AM, 8:30 AM, 11:30 AM and 2:30 PM, a single tablet at 5:30 PM.  Unfortunately, the dyskinesias seem to have gotten quite a bit worse.  There were actually couple of incidents with severe elbow trauma that warranted an emergency room visit.    In the interim, she states that with the morning dose of Sinemet, within an hour the dyskinesias start and they remain rather quite profound, she has had 4 to 5 hours in a day consecutively with severe involuntary movements.  On her own she has cut the Sinemet down to 0.5 tablet at 2:30 PM.  There does not seem to be much of an improvement though the subsequent dyskinesias may have gotten a little bit better.  The involuntary movements do reduce over the course of the afternoon and evening, only to recur when she starts taking her medications the following day.    Medical, surgical and family histories are reviewed, there are no new drug allergies.  They are asking about a referral to physical therapy to allow her to use a very unique .  Other than her Sinemet and Requip, she remains on coenzyme Q 10, turmeric and vitamin D supplement.    Review of Systems   Constitutional: Positive for malaise/fatigue.   Musculoskeletal: Negative for falls.   Neurological: Positive for tremors. Negative for tingling.   Psychiatric/Behavioral: Negative for depression and memory loss.   All other systems reviewed and are negative.       Objective:     Pulse 68   Temp 36.3 °C (97.3 °F) (Temporal)   Resp 15   SpO2 97%      Physical Exam    She presents in a wheelchair, as usual, and is in no acute distress.   Still, she is in a near continuous state of movement involving both upper extremities, axial musculatures and the head.  Her vital signs are stable though limited because of the movements.  There is no malar rash or sialorrhea.  The neck is supple.    She is oriented.  PERRLA/EOMI, visual fields are grossly full.  Facial movements are symmetric.  The involuntary movements are documented above.  Incoordination is significant because of the dyskinesias.  Grasp is intact.  I did not stand her to walk.     Assessment/Plan:     1. Parkinson's disease (HCC)  We will have to cut down the dopamine noticeably, and then slowly try to increase again, using Xadago, the newest of MAO B inhibitors, indicated for motor fluctuations and moderate disease.  Effectively increasing the half-life of dopamine, we will have to reduce the Sinemet to a single tablet with each dose still taken at a 3-hour interval beginning at 5:30 AM.  She will continue the Requip 2 mg twice daily for now though we may need to stop this as well.    She asked about adding Artane, she had been on the drug in the past, and there are far too many potential side effects and I do not think this will provide much benefit clinically.  All of this was reviewed at length with geraldine as well.    Sinemet 25/100 will be taken as a single tablet 5 times a day, Requip 2 mg twice daily will be unchanged.  She will do this for 2 weeks in the hopes of seeing a significant reduction in the dyskinesias, she was warned that the overall slowness and stiffness will increase.  If the dyskinesias continue unabated, we will have to reduce the Sinemet further.  At that point we will add Xadago 50 mg in the morning for 2 weeks, we can increase further up to 100 mg.  Phone contact in the interim, we will follow-up in the next 4 months.  I did write a referral to physical therapy specifically asking for use of the newest .    - REFERRAL TO PHYSICAL THERAPY Reason for Therapy:  Eval/Treat/Report  - Safinamide Mesylate (XADAGO) 50 MG Tab; Take 1 Tab by mouth every day.  Dispense: 30 Tab; Refill: 1    Time: 20-minute spent face-to-face for exam, review, discussion, and education, of this over 50% of the time spent counseling and coordinating care.

## 2020-03-17 ENCOUNTER — TELEPHONE (OUTPATIENT)
Dept: NEUROLOGY | Facility: MEDICAL CENTER | Age: 80
End: 2020-03-17

## 2020-03-17 NOTE — TELEPHONE ENCOUNTER
----- Message from Kaleigh Tucker sent at 3/16/2020  8:58 AM PDT -----  Regarding: Prescription Question  Contact: [phone]  My brain is fuzzy. Is it from the lower dose of the carb-levo?  Is it okay if I have 1/2 a pill in addition to the 1  pill at 5:30 so I can get my taxes done ?

## 2020-03-18 NOTE — TELEPHONE ENCOUNTER
A fuzziness is probably from the lower dose of Sinemet.  I am trying to get her on a lower dose of the Sinemet, if she does want to use a 0.5 tablet dose at 5:30 PM briefly for tax purposes, I am fine with that.

## 2020-03-30 ENCOUNTER — TELEPHONE (OUTPATIENT)
Dept: NEUROLOGY | Facility: MEDICAL CENTER | Age: 80
End: 2020-03-30

## 2020-03-30 DIAGNOSIS — G20.A1 PARKINSON'S DISEASE (HCC): ICD-10-CM

## 2020-03-30 RX ORDER — SELEGILINE HYDROCHLORIDE 5 MG/1
5 CAPSULE ORAL 2 TIMES DAILY
Qty: 60 CAP | Refills: 3 | Status: SHIPPED | OUTPATIENT
Start: 2020-03-30 | End: 2020-05-21

## 2020-03-30 NOTE — TELEPHONE ENCOUNTER
----- Message from Kaleigh Tucker sent at 3/27/2020 10:55 AM PDT -----  Regarding: Prescription Question  Contact: [phone]  Good morning. Today I took the second xadago.  I got a letter from my drug insurance company saying that the xadago is not on their formulary, which explains why I had to pay $600 for 30 days supply.  The insurance company suggested that I take selegiline capsules instead. The insurance company covers it.  Can you please send [facility] [address] a prescription for 30 days, 60 capsules .  I thank you   Kaleigh Tucker

## 2020-03-30 NOTE — TELEPHONE ENCOUNTER
Tell her we can start selegiline 5 mg twice a day taken in the morning and at midday.  The drug can activate patients, thus it is not recommended it is taken in the evening.  Please also let her know that this is not identical to Xadago.  Their mechanisms of action are slightly different.  Hopefully it will work for her.

## 2020-04-03 ENCOUNTER — TELEPHONE (OUTPATIENT)
Dept: NEUROLOGY | Facility: MEDICAL CENTER | Age: 80
End: 2020-04-03

## 2020-04-03 NOTE — TELEPHONE ENCOUNTER
Regarding: RE: Prescription Question  ----- Message from Jose Landin M.D. sent at 4/2/2020  5:50 PM PDT -----       ----- Message from Kaleigh Tucker to Bekobi ARGUELLES Yaria, Med Ass't sent at 4/2/2020  2:46 PM -----   Thank you       ----- Message -----       From:Bekobi B Matthewatora, Med Ass't       Sent:4/2/2020 12:59 PM PDT         To:Kaleigh Tucker    Subject:RE: Prescription Question    I have forwarded him this message.       ----- Message -----       From:Kaleigh Tucker       Sent:4/2/2020  8:38 AM PDT         To:Jose Landin M.D.    Subject:Prescription Question    Good morning! I have insomnia from the Xadago, and since I stopped it and am taking the selegiline, insomnia is still happening.   Both list insomnia as possible side effects.  I have never had a problem with my sleep before and do not know how to deal with it. I am averaging 2-3 hours of sleep each night for the last week or so. Do you have any advice or solution?  Thank you!

## 2020-04-06 ENCOUNTER — TELEPHONE (OUTPATIENT)
Dept: NEUROLOGY | Facility: MEDICAL CENTER | Age: 80
End: 2020-04-06

## 2020-04-06 NOTE — TELEPHONE ENCOUNTER
"----- Message from Kaleigh Tucker sent at 4/5/2020 10:16 AM PDT -----  Regarding: Prescription Question  Contact: [phone]  Good morning. I was wrong. I don't have insomnia. I realized that I probably had too much medical marijuana to control the tremors that occur when I have too much carb-levo. The selegiline literature said that I might have to reduce the amount of carb-levo and I realized that I did not need 1 pill at 5:30pm and that 1/2 a pill works.  Thus, I was experiencing a marijuana \"high.\"  I have no sleep problems now. I'm getting about 8 hours of sleep, interrupted only when I have to use the bathroom.  Thank you!  Kaleigh Tucker  "

## 2020-04-06 NOTE — TELEPHONE ENCOUNTER
"----- Message from Kaleigh Tucker sent at 4/5/2020 10:16 AM PDT -----  Regarding: Prescription Question  Contact: [phone]  Good morning. I was wrong. I don't have insomnia. I realized that I probably had too much medical marijuana to control the tremors that occur when I have too much carb-levo. The selegiline literature said that I might have to reduce the amount of carb-levo and I realized that I did not need 1 pill at 5:30pm and that 1/2 a pill works.  Thus, I was experiencing a marijuana \"high.\"  I have no sleep problems now. I'm getting about 8 hours of sleep, interrupted only when I have to use the bathroom.  Thank you!  Kaleigh Tucker  "

## 2020-04-06 NOTE — TELEPHONE ENCOUNTER
It sounds as if she has figured it out.  The less medical THC that is required, the better for her.  Yes, patient is on selegiline, quite often can reduce the dose of Sinemet.

## 2020-04-08 ENCOUNTER — PATIENT MESSAGE (OUTPATIENT)
Dept: NEUROLOGY | Facility: MEDICAL CENTER | Age: 80
End: 2020-04-08

## 2020-04-08 NOTE — TELEPHONE ENCOUNTER
"----- Message from Kaleigh Tucker sent at 4/8/2020  8:34 AM PDT -----  Regarding: Prescription Question  Contact: [phone]  Good morning. I'm working with a nutritionist to help me regain the weight I lost. You might remember that I weigh 100 lbs and at 5'8\", that is not enough.    Following the advice of my nutritionist, I am eating about 3000 calories a day. However, I am not gaining any weight. If it was caused by the tremors, that is no longer an issue. They have almost completely stopped.    She has me taking supplements, eg, pre- and postbiotics, enzymes, fish oil, vitamin D, and now thinks that I should try L-glutamine. However, she said that I should check with you first. I am willing to try it because nothing else seems to be working.    What do you think? If my problem is caused by malabsorption of my food,  the L-glutamine should help. If it does, I should notice a gradual increase in my weight.    I will keep you posted.    Thank you,  Kaleigh Tucker  "

## 2020-04-09 ENCOUNTER — TELEPHONE (OUTPATIENT)
Dept: NEUROLOGY | Facility: MEDICAL CENTER | Age: 80
End: 2020-04-09

## 2020-04-09 NOTE — PATIENT COMMUNICATION
She should follow along with the nutritionist recommendations.  Hopefully this will allow her to add on weight.

## 2020-04-09 NOTE — TELEPHONE ENCOUNTER
"Regarding: Prescription Question  ----- Message from Hardeep Augustine Ass't sent at 4/8/2020  3:12 PM PDT -----       ----- Message from Kaleigh Tucker to Jose Landin M.D. sent at 4/8/2020  8:34 AM -----   Good morning. I'm working with a nutritionist to help me regain the weight I lost. You might remember that I weigh 100 lbs and at 5'8\", that is not enough.    Following the advice of my nutritionist, I am eating about 3000 calories a day. However, I am not gaining any weight. If it was caused by the tremors, that is no longer an issue. They have almost completely stopped.    She has me taking supplements, eg, pre- and postbiotics, enzymes, fish oil, vitamin D, and now thinks that I should try L-glutamine. However, she said that I should check with you first. I am willing to try it because nothing else seems to be working.    What do you think? If my problem is caused by malabsorption of my food,  the L-glutamine should help. If it does, I should notice a gradual increase in my weight.    I will keep you posted.    Thank you,  Kaleigh Tucker  "

## 2020-04-09 NOTE — TELEPHONE ENCOUNTER
"Regarding: RE: Prescription Question  ----- Message from Jose Landin M.D. sent at 4/8/2020  6:14 PM PDT -----       ----- Message from Kaleigh Tucker to Beto Haque, Med Ass't sent at 4/8/2020  3:18 PM -----   Thank you,  Kaleigh       ----- Message -----       From:Bekobi Castroatora, Med Ass't       Sent:4/8/2020  3:13 PM PDT         To:Kaleigh Tucker    Subject:RE: Prescription Question    I have forwarded him this message.       ----- Message -----       From:Kaleigh Tucker       Sent:4/8/2020  8:34 AM PDT         To:Jose Landin M.D.    Subject:Prescription Question    Good morning. I'm working with a nutritionist to help me regain the weight I lost. You might remember that I weigh 100 lbs and at 5'8\", that is not enough.    Following the advice of my nutritionist, I am eating about 3000 calories a day. However, I am not gaining any weight. If it was caused by the tremors, that is no longer an issue. They have almost completely stopped.    She has me taking supplements, eg, pre- and postbiotics, enzymes, fish oil, vitamin D, and now thinks that I should try L-glutamine. However, she said that I should check with you first. I am willing to try it because nothing else seems to be working.    What do you think? If my problem is caused by malabsorption of my food,  the L-glutamine should help. If it does, I should notice a gradual increase in my weight.    I will keep you posted.    Thank you,  Kaleigh Tucker  "

## 2020-04-21 ENCOUNTER — PATIENT MESSAGE (OUTPATIENT)
Dept: NEUROLOGY | Facility: MEDICAL CENTER | Age: 80
End: 2020-04-21

## 2020-04-21 NOTE — TELEPHONE ENCOUNTER
----- Message from Kaleigh Tucker sent at 4/21/2020  8:42 AM PDT -----  Regarding: Non-Urgent Medical Question  Contact: [phone]  Good morning.   Is there anything I can do to help with the nocturia I experience every night? I have copious amounts of urine every hour and it interrupts my sleep. I don't have any problems during the day.   Thank you.  Kaleigh Tucker

## 2020-04-22 NOTE — TELEPHONE ENCOUNTER
Regarding: Non-Urgent Medical Question  ----- Message from Hardeep Augustine Ass't sent at 4/21/2020 10:49 AM PDT -----       ----- Message from Kaleigh Tucker to Jose Landin M.D. sent at 4/21/2020  8:42 AM -----   Good morning.   Is there anything I can do to help with the nocturia I experience every night? I have copious amounts of urine every hour and it interrupts my sleep. I don't have any problems during the day.   Thank you.  Kaleigh Tucker

## 2020-05-14 ENCOUNTER — TELEPHONE (OUTPATIENT)
Dept: NEUROLOGY | Facility: MEDICAL CENTER | Age: 80
End: 2020-05-14

## 2020-05-14 NOTE — TELEPHONE ENCOUNTER
----- Message from Kaleigh Tucker sent at 5/13/2020  2:25 PM PDT -----  Regarding: Prescription Question  Contact: [phone]  My nutritionist wants  me to check with Dr Landin about taking butryic acid.  Thank you   Kaleigh

## 2020-05-15 NOTE — TELEPHONE ENCOUNTER
I cannot render an opinion one way of the other.  I presume she wants to try this for her Parkinson's disease, and I know of no published data regarding its use for this condition.  I also have no comment as to whether or not it is safe to be used in any condition.

## 2020-05-20 DIAGNOSIS — G20.A1 PARKINSON'S DISEASE (HCC): ICD-10-CM

## 2020-05-21 RX ORDER — SELEGILINE HYDROCHLORIDE 5 MG/1
CAPSULE ORAL
Qty: 60 CAP | Refills: 3 | Status: SHIPPED | OUTPATIENT
Start: 2020-05-21 | End: 2020-08-17

## 2020-06-15 DIAGNOSIS — G20.A1 PARKINSON'S DISEASE (HCC): ICD-10-CM

## 2020-06-15 NOTE — TELEPHONE ENCOUNTER
Received request via: Patient    Was the patient seen in the last year in this department? Yes    Does the patient have an active prescription (recently filled or refills available) for medication(s) requested? Yes.

## 2020-08-16 DIAGNOSIS — G20.A1 PARKINSON'S DISEASE (HCC): ICD-10-CM

## 2020-08-17 RX ORDER — SELEGILINE HYDROCHLORIDE 5 MG/1
CAPSULE ORAL
Qty: 60 CAP | Refills: 3 | Status: SHIPPED | OUTPATIENT
Start: 2020-08-17 | End: 2020-09-11 | Stop reason: SDUPTHER

## 2020-08-17 NOTE — TELEPHONE ENCOUNTER
Received request via: Pharmacy    Was the patient seen in the last year in this department? Yes    Does the patient have an active prescription (recently filled or refills available) for medication(s) requested? No     Patient was last seen on 03/12/2020 , medication was last filled on 05/21/2020.

## 2020-09-11 ENCOUNTER — OFFICE VISIT (OUTPATIENT)
Dept: NEUROLOGY | Facility: MEDICAL CENTER | Age: 80
End: 2020-09-11
Payer: MEDICARE

## 2020-09-11 VITALS — DIASTOLIC BLOOD PRESSURE: 78 MMHG | SYSTOLIC BLOOD PRESSURE: 142 MMHG | HEART RATE: 70 BPM

## 2020-09-11 DIAGNOSIS — G20.A1 PARKINSON'S DISEASE (HCC): Primary | ICD-10-CM

## 2020-09-11 PROCEDURE — 99214 OFFICE O/P EST MOD 30 MIN: CPT | Performed by: PSYCHIATRY & NEUROLOGY

## 2020-09-11 RX ORDER — SELEGILINE HYDROCHLORIDE 5 MG/1
CAPSULE ORAL
Qty: 60 CAP | Refills: 3 | Status: SHIPPED | OUTPATIENT
Start: 2020-09-11 | End: 2020-12-17

## 2020-09-11 RX ORDER — ROPINIROLE 2 MG/1
2 TABLET, FILM COATED ORAL 2 TIMES DAILY
Qty: 60 TAB | Refills: 6 | Status: SHIPPED | OUTPATIENT
Start: 2020-09-11 | End: 2021-05-10

## 2020-09-11 RX ORDER — ROPINIROLE 2 MG/1
2 TABLET, FILM COATED ORAL 2 TIMES DAILY
Qty: 60 TAB | Refills: 6 | Status: CANCELLED | OUTPATIENT
Start: 2020-09-11

## 2020-09-11 ASSESSMENT — ENCOUNTER SYMPTOMS: MEMORY LOSS: 0

## 2020-09-11 NOTE — PROGRESS NOTES
"Subjective:      Kaleigh Tucker is a 79 y.o. female who presents for follow-up, with a history of severe Parkinson's disease with significant freezing and dyskinesias.     BROOKE Farris presents today in a wheelchair, unaccompanied by her caregiver.  She is in a continuous state of motion, speech is curtailed.  She brings with her a typed out list of medications as well as problems.  She is basically rigid on her ropinirole and Sinemet scheduling taking 1 of each at 530 and then a single Sinemet tablet every 3 hours until 5:30 in the evening when she takes another of each.  Eldepryl 5 mg is taken with the 5:30 AM and 11:30 AM dosing.    With the schedule, within 15 minutes of the 5:30 AM and 5:30 PM dosing, she actually freezes, it takes about 30 minutes before things improve, she often has to lie down.  She then can function to a limited degree, but at baseline.  Things begin to slow down again, she begins to freeze, she describes it as being \"glued\" to the floor.  The cycle repeats itself throughout the day though not as severely when she takes only her Sinemet and Eldepryl.    She can ambulate but very limited distances using a walker, and this is only when she is \"on\".  The problem is when she is \"on\", the dyskinesias act up rather profoundly.  These are generalized.  The involuntary movements always attenuate when she goes to sleep.    Medical, surgical and family histories are reviewed, there are no new drug allergies.  She is on Sinemet 25/100 along with ropinirole 2 mg taken with the above schedule, Eldepryl 5 mg in the morning and at midday as above, the rest is vitamin D and coenzyme Q10 with turmeric.    Review of Systems   Unable to perform ROS: Acuity of condition   Psychiatric/Behavioral: Negative for memory loss.        Objective:     /78 (BP Location: Left arm, Patient Position: Sitting, BP Cuff Size: Adult)   Pulse 70      Physical Exam    She appears in no acute distress.  Vital signs are stable.  " "There is no malar rash or sialorrhea.  The neck is supple.  Cardiac evaluation reveals a regular rhythm.    She is fully oriented, there is no aphasia.    There is significant hypophonia, verbal communication is curtailed for this reason.  PERRLA/EOMI, visual fields are grossly full.  There is no dysarthria.  Dyskinetic movements of the head and neck are continuous.    Musculoskeletal exam again reveals the dyskinesias that are both truncal as well as appendicular.  There is no tremor.  Strength is intact in the upper extremities, it was difficult to assess in the lower extremities but there is no obvious, focal weakness.    She required significant assistance simply to stand off of the examination table and transfer to her wheelchair.  Even with the dyskinesias, there is no appendicular dystaxia with the upper extremities.  Fine motor control in the hands and fingers is curtailed as much because of muscle tone as it is because of the dyskinesias.     Assessment/Plan:     1. Parkinson's disease (HCC)  As usual, we are still between a rock and a hard place when it comes to her medications, she has had consistent problems with peak-dose dyskinesias as well as wearing-off phenomena and freezing.  The 15-30-minute interval where she freezes I think is more to do with loss of efficacy of the drug, waiting for the new dosing to \"turn on\".  Eldepryl will be stopped, it is not clear whether this is providing much benefit, though it was used because of daytime drowsiness.  I am concerned that it is making the dyskinesias worse.    Minimal and small changes in her medication regimen are not likely to result in significant changes in the dyskinesias or the freezing/wearing-off symptoms.  It will require a major change such as a drug holiday, in which case she would need to be hospitalized.  The other possibility would be to send her to a movement disorder center, though I am not clear whether she would ever be a DBS candidate. "  We will play phone contact in the interim, we will follow-up otherwise in about 4 months.    - selegiline (ELDEPRYL) 5 MG capsule; TAKE 1 CAPSULE BY MOUTH TWICE A DAY  Dispense: 60 Cap; Refill: 3  - carbidopa-levodopa (SINEMET)  MG Tab; 1 tab 5 times a day at 5:30AM, 11:30AM, 2:30 PM, and 5:30 PM  Dispense: 450 Tab; Refill: 3  - ROPINIRole (REQUIP) 2 MG tablet; Take 1 Tab by mouth 2 Times a Day. Take at 5:30AM and 5:30PM  Dispense: 60 Tab; Refill: 6    Time: 20-minute spent face-to-face for exam, review, discussion, and education, of this over 50% of the time spent counseling and coordinating care.

## 2020-09-25 ENCOUNTER — PATIENT MESSAGE (OUTPATIENT)
Dept: NEUROLOGY | Facility: MEDICAL CENTER | Age: 80
End: 2020-09-25

## 2020-10-16 ENCOUNTER — HOSPITAL ENCOUNTER (OUTPATIENT)
Facility: MEDICAL CENTER | Age: 80
End: 2020-10-16
Attending: INTERNAL MEDICINE
Payer: MEDICARE

## 2020-10-16 PROCEDURE — 81001 URINALYSIS AUTO W/SCOPE: CPT

## 2020-10-17 LAB
APPEARANCE UR: ABNORMAL
BACTERIA #/AREA URNS HPF: NEGATIVE /HPF
BILIRUB UR QL STRIP.AUTO: NEGATIVE
CAOX CRY #/AREA URNS HPF: NORMAL /HPF
COLOR UR: YELLOW
EPI CELLS #/AREA URNS HPF: NEGATIVE /HPF
GLUCOSE UR STRIP.AUTO-MCNC: NEGATIVE MG/DL
HYALINE CASTS #/AREA URNS LPF: NORMAL /LPF
KETONES UR STRIP.AUTO-MCNC: NEGATIVE MG/DL
LEUKOCYTE ESTERASE UR QL STRIP.AUTO: NEGATIVE
MICRO URNS: ABNORMAL
NITRITE UR QL STRIP.AUTO: NEGATIVE
PH UR STRIP.AUTO: 6.5 [PH] (ref 5–8)
PROT UR QL STRIP: NEGATIVE MG/DL
RBC # URNS HPF: NORMAL /HPF
RBC UR QL AUTO: NEGATIVE
SP GR UR STRIP.AUTO: 1.02
UROBILINOGEN UR STRIP.AUTO-MCNC: 0.2 MG/DL
WBC #/AREA URNS HPF: NORMAL /HPF

## 2020-12-17 DIAGNOSIS — G20.A1 PARKINSON'S DISEASE (HCC): ICD-10-CM

## 2020-12-17 RX ORDER — SELEGILINE HYDROCHLORIDE 5 MG/1
CAPSULE ORAL
Qty: 180 CAP | Refills: 1 | Status: SHIPPED | OUTPATIENT
Start: 2020-12-17 | End: 2021-10-25

## 2021-01-14 DIAGNOSIS — Z23 NEED FOR VACCINATION: ICD-10-CM

## 2021-05-07 DIAGNOSIS — G20.A1 PARKINSON'S DISEASE (HCC): ICD-10-CM

## 2021-05-10 RX ORDER — ROPINIROLE 2 MG/1
2 TABLET, FILM COATED ORAL 2 TIMES DAILY
Qty: 60 TABLET | Refills: 6 | Status: SHIPPED | OUTPATIENT
Start: 2021-05-10 | End: 2021-07-22 | Stop reason: SDUPTHER

## 2021-05-10 NOTE — TELEPHONE ENCOUNTER
Received request via: Pharmacy    Was the patient seen in the last year in this department? Yes    Does the patient have an active prescription (recently filled or refills available) for medication(s) requested? No     Patient last seen on 09/11/2020 , medication last filled on 09/11/2020.

## 2021-06-09 ENCOUNTER — PATIENT MESSAGE (OUTPATIENT)
Dept: NEUROLOGY | Facility: MEDICAL CENTER | Age: 81
End: 2021-06-09

## 2021-06-09 ENCOUNTER — OFFICE VISIT (OUTPATIENT)
Dept: NEUROLOGY | Facility: MEDICAL CENTER | Age: 81
End: 2021-06-09
Attending: PSYCHIATRY & NEUROLOGY
Payer: MEDICARE

## 2021-06-09 VITALS
RESPIRATION RATE: 16 BRPM | HEIGHT: 68 IN | HEART RATE: 99 BPM | WEIGHT: 110.45 LBS | OXYGEN SATURATION: 96 % | BODY MASS INDEX: 16.74 KG/M2 | SYSTOLIC BLOOD PRESSURE: 100 MMHG | TEMPERATURE: 98 F | DIASTOLIC BLOOD PRESSURE: 60 MMHG

## 2021-06-09 DIAGNOSIS — R26.89 PRIMARY FREEZING OF GAIT: ICD-10-CM

## 2021-06-09 DIAGNOSIS — G20.A1 PARKINSON'S DISEASE (HCC): ICD-10-CM

## 2021-06-09 PROCEDURE — 99215 OFFICE O/P EST HI 40 MIN: CPT | Performed by: PSYCHIATRY & NEUROLOGY

## 2021-06-09 PROCEDURE — 99212 OFFICE O/P EST SF 10 MIN: CPT | Performed by: PSYCHIATRY & NEUROLOGY

## 2021-06-09 RX ORDER — TOLTERODINE TARTRATE 1 MG/1
1 TABLET, EXTENDED RELEASE ORAL DAILY
COMMUNITY
End: 2021-10-08

## 2021-06-09 ASSESSMENT — PATIENT HEALTH QUESTIONNAIRE - PHQ9: CLINICAL INTERPRETATION OF PHQ2 SCORE: 0

## 2021-06-09 NOTE — PROGRESS NOTES
Chief Complaint   Patient presents with   • New Patient     Parkinsons       History of present illness:  Kaleigh Tucker 80 y.o. female with Parkinson's disease since 2005. She is wheelchair dependent due to freezing of gait since at least last June. She is in a group home.     She was last seen by Dr. Landin in 9/2020. Dr. Landin described L-DOPA responsive FOG with her ON state complicated by peak dose dyskinesias.   The main problem is freezing of gait. When her sinemet kicks in, her freezing is improved. On her current drug regimen, she does minimal walking. C/L is effective for reducing her tremor.     PD Meds:  Carb/levo 25/100 1 tab 5x/day at 5:30am, 8:30am, 11:30am, 2:30pm, 5:30pm  Ropinirole 2mg twice daily   Selegiline 5mg twice daily     Movement-Specific ROS  Sleep: Sleeps from 8pm - 4am.   Swallowing: No problems   Constipation: Has a bowel movement twice daily. She uses miralax every day.   Urination: Has an overactive bladder.    Dizziness: No   Hallucinations: No    Excessive daytime somnolence: No   Mood: No problems   Anxiety: No  Balance: She only walks with physical therapy.     Past medical history:   Past Medical History:   Diagnosis Date   • Cancer (HCC)     melanoma on back, surgically removed in 1981. Basal cell on scalp removed 2010   • Kidney stone 9/4/2015   • Muscle disorder     Parkinsons   • Parkinson's disease (HCC)        Past surgical history:   Past Surgical History:   Procedure Laterality Date   • CYSTOSCOPY Right 9/5/2015    Procedure: CYSTOSCOPY;  Surgeon: Smith Bennett M.D.;  Location: SURGERY [facility];  Service:    • STENT PLACEMENT  9/5/2015    Procedure: STENT PLACEMENT;  Surgeon: Smith Bennett M.D.;  Location: [facility];  Service:    • URETEROSCOPY Right 9/5/2015    Procedure: URETEROSCOPY;  Surgeon: Smith Bennett M.D.;  Location: [facility];  Service:    • APPENDECTOMY  1962   • KNEE REPLACEMENT, TOTAL         Family history:   Family History    Problem Relation Age of Onset   • Heart Disease Mother         Heart attack/   • Hypertension Mother    • Hyperlipidemia Sister    • Heart Disease Brother    • Cancer Maternal Aunt         stomach cancer/   • Hypertension Maternal Aunt    • Heart Disease Maternal Grandmother    • Cancer Maternal Grandfather         throat cancer   • Hypertension Maternal Uncle        Social history:   Social History     Socioeconomic History   • Marital status:      Spouse name: Not on file   • Number of children: Not on file   • Years of education: Not on file   • Highest education level: Not on file   Occupational History   • Not on file   Tobacco Use   • Smoking status: Never Smoker   • Smokeless tobacco: Never Used   Vaping Use   • Vaping Use: Never used   Substance and Sexual Activity   • Alcohol use: No     Alcohol/week: 0.0 oz   • Drug use: No   • Sexual activity: Never     Birth control/protection: Abstinence   Other Topics Concern   • Not on file   Social History Narrative   • Not on file     Social Determinants of Health     Financial Resource Strain:    • Difficulty of Paying Living Expenses:    Food Insecurity:    • Worried About Running Out of Food in the Last Year:    • Ran Out of Food in the Last Year:    Transportation Needs:    • Lack of Transportation (Medical):    • Lack of Transportation (Non-Medical):    Physical Activity:    • Days of Exercise per Week:    • Minutes of Exercise per Session:    Stress:    • Feeling of Stress :    Social Connections:    • Frequency of Communication with Friends and Family:    • Frequency of Social Gatherings with Friends and Family:    • Attends Muslim Services:    • Active Member of Clubs or Organizations:    • Attends Club or Organization Meetings:    • Marital Status:    Intimate Partner Violence:    • Fear of Current or Ex-Partner:    • Emotionally Abused:    • Physically Abused:    • Sexually Abused:        Current medications:   Current  "Outpatient Medications   Medication   • carbidopa-levodopa (SINEMET)  MG Tab   • predniSONE (DELTASONE) 10 MG Tab   • ROPINIRole (REQUIP) 2 MG tablet   • selegiline (ELDEPRYL) 5 MG capsule   • vitamin D (CHOLECALCIFEROL) 1000 UNIT Tab   • coenzyme Q-10 30 MG capsule   • TURMERIC PO     No current facility-administered medications for this visit.       Medication Allergy:  No Known Allergies        Physical examination:   Vitals:    06/09/21 1348 06/09/21 1353   BP: 122/70 100/60   BP Location: Left arm Left arm   Patient Position: Sitting Standing   BP Cuff Size: Adult Adult   Pulse: 98 99   Resp: 16    Temp: 36.7 °C (98 °F)    SpO2: 95% 96%   Weight: 50.1 kg (110 lb 7.2 oz)    Height: 1.727 m (5' 8\")        UNIFIED PARKINSON'S DISEASE RATING SCALE PART III: MOTOR EXAMINATION    The patient is ON medication for treating the symptoms of Parkinson's disease  The patient's clinical state is: OFF  Last L-DOPA dose: 11:30am  Time of exam: 2:11pm     Speech: 3: Moderate, speech is difficult to understand to the point that some, but not most, sentences are poorly understood  Facial expression: 2: Mild - In addition to decreased eye-blink frequency, masked facies present in the lower face, but lips not parted.  Rest tremor amplitude    Lip/jaw: 0: Normal - No tremor.    RUE: 2: Mild: > 1 cm but < 3 cm in maximal amplitude.    LUE: 2: Mild: > 1 cm but < 3 cm in maximal amplitude.    RLE: 0: Normal - No tremor.      LLE: 2: Mild: > 1 cm but < 3 cm in maximal amplitude.   Postural tremor of the hands   Right: 1: Slight - Tremor is present but less than 1 cm in amplitude.   Left: 2: Mild - Tremor is at least 1 but less than 3 cm in amplitude.  Kinetic tremor of the hands    Right: 1: Slight - Tremor is present but less than 1 cm in amplitude.   Left: 1: Slight - Tremor is present but less than 1 cm in amplitude.  Rigidity    Neck: 4: Severe - rigidity detected without the activation maneuver and full range of motion not " achieved.   RUE: 3: Moderate - rigidity detected without the activation maneuver; full range of motion is achieved with effort.   LUE: 3: Moderate - rigidity detected without the activation maneuver; full range of motion is achieved with effort.   RLE: 0: Normal - no rigidity   LLE: 0: Normal - no rigidity  Finger tapping    Right: 2: Mild - any of the following: a) 3 to 5 interruptions during tapping; b) mild slowing; c) the amplitude decrements midway in the 10-tap sequence.   Left: 3: Moderate - any of the following: a) more than 5 interruptions during tapping or at least one longer arrest (freeze) in ongoing movement; b) moderate slowing; c) the amplitude decrements starting after the 1st tap.   Hand movements   Right: 2: Mild - any of the following: a) 3 to 5 interruptions during the movements; b) mild slowing; c) the amplitude decrements midway in the task.     Left: 2: Mild - any of the following: a) 3 to 5 interruptions during the movements; b) mild slowing; c) the amplitude decrements midway in the task.   Toe tapping    Right: 2: Mild - any of the following: a) 3 to 5 interruptions during the tapping movements; b) mild slowing; c) amplitude decrements midway in the task.    Left: 3: Moderate - any of the following: a) more than 5 interruptions during the tapping movements or at least one longer arrest (freeze) in ongoing movement; b) moderate slowing; c) amplitude decrements after the 1st tap.   Arising from chair: 4: Severe - unable to rise without help.  Posture: 4: Severe - Flexion, scoliosis or leaning with extreme abnormality of posture.  Gait: 4: Severe - Cannot walk at all or only with another person's assistance.  Freezing of gait: 4: Severe - Freezes multiple times during straight walking.  Postural stability: 4: Severe - Very unstable, tends to lose balance spontaneously or with just a gentle pull on the shoulders.   Body bradykinesia: 4: Severe - Severe global slowness and poverty of spontaneous  movements.  Constancy of rest tremor: 4: Severe - Tremor at rest is present > 75% of the entire examination period.    Were dyskinesias present during examination? No   If yes, did these movements interfere with your ratings? No     Meredith and Yahr Stage: 5: Wheelchair bound or bedridden unless aided.      Labs:  None    Imaging:   None     ASSESSMENT AND PLAN:  Problem List Items Addressed This Visit     Parkinson's disease (HCC)    Relevant Medications    carbidopa-levodopa (SINEMET)  MG Tab    Primary freezing of gait          1. Parkinson's disease (HCC)  - carbidopa-levodopa (SINEMET)  MG Tab; Take 1.5 Tablets by mouth every 3 hours while awake every 3 hours while awake for 14 days, THEN 2 Tablets every 3 hours while awake every 3 hours while awake for 30 days.  Dispense: 900 tablet; Refill: 2    2. Primary freezing of gait  This is a 80 year old female with advanced PD. She is wheelchair bound and unable to walk. She states that when she was on 2 tablets of sinemet she was able to walk over a year ago but was decreased due to dyskinesia. I have provided a titration schedule to increase her back to 2 tabs every 3 hours and if dyskinesia occurs I may add amantadine.     FOLLOW-UP:   Return in about 6 weeks (around 7/21/2021).    Total time spent for the day for this patient unrelated to procedure time is: 40 minutes. I spent 29 minutes in face to face time and I spent 8 minutes pre-charting and 3 minutes in post-visit documentation.      Dr. Santiago Medina D.O.  [facility]   Movement Disorders Specialist

## 2021-06-09 NOTE — PATIENT INSTRUCTIONS
Increase your carbidopa/levodopa to 1.5 tabs every 3 hours for 2 weeks.   If this is ineffective in improving your gait/freezing, increase again to 2 tabs every 3 hours.

## 2021-06-13 ENCOUNTER — PATIENT MESSAGE (OUTPATIENT)
Dept: NEUROLOGY | Facility: MEDICAL CENTER | Age: 81
End: 2021-06-13

## 2021-06-13 DIAGNOSIS — G24.01 DRUG-INDUCED DYSKINESIA: Primary | ICD-10-CM

## 2021-06-13 DIAGNOSIS — G20.A1 PARKINSON'S DISEASE (HCC): ICD-10-CM

## 2021-06-14 RX ORDER — AMANTADINE HYDROCHLORIDE 100 MG/1
100 TABLET ORAL 2 TIMES DAILY
Qty: 60 TABLET | Refills: 2 | Status: SHIPPED | OUTPATIENT
Start: 2021-06-14 | End: 2021-06-14

## 2021-06-14 RX ORDER — AMANTADINE HYDROCHLORIDE 100 MG/1
100 TABLET ORAL 2 TIMES DAILY
Qty: 60 TABLET | Refills: 2 | Status: SHIPPED | OUTPATIENT
Start: 2021-06-14 | End: 2021-08-05 | Stop reason: SDUPTHER

## 2021-06-20 PROBLEM — G62.9 NEUROPATHY: Status: ACTIVE | Noted: 2021-06-20

## 2021-06-20 PROBLEM — M19.90 OSTEOARTHRITIS: Status: ACTIVE | Noted: 2021-06-20

## 2021-06-20 PROBLEM — Z85.820 HISTORY OF MELANOMA: Status: ACTIVE | Noted: 2021-06-20

## 2021-06-20 PROBLEM — R53.81 DEBILITY: Status: ACTIVE | Noted: 2021-06-20

## 2021-06-20 PROBLEM — F11.20 UNCOMPLICATED OPIOID DEPENDENCE (HCC): Status: ACTIVE | Noted: 2021-06-20

## 2021-06-20 PROBLEM — G89.29 CHRONIC PAIN: Status: ACTIVE | Noted: 2021-06-20

## 2021-07-14 DIAGNOSIS — G20.A1 PARKINSON'S DISEASE (HCC): ICD-10-CM

## 2021-07-21 NOTE — PROGRESS NOTES
Chief Complaint   Patient presents with   • Follow-Up     Parkinson's disease       History of present illness:  Kaleigh Tucker 80 y.o. female with Parkinson's disease since 2005 with L-DOPA responsive freezing of gait. Her ON state has been complicated by peak dose dyskinesia.     PD Meds:  Carb/levo 25/100 2 tab 5x/day at 5:30am, 8:30am, 11:30am, 2:30pm, 5:30pm. She has a 2 hour duration of benefit with reduced freezing for the morning doses but the later doses sometimes do not kick in.   Ropinirole 2mg twice daily   Selegiline 5mg twice daily   Amantadine 100mg twice daily     Movement-Specific ROS  Sleep: Sleeps from 8pm - 4am.   Swallowing: No problems   Constipation: Has a bowel movement twice daily. She uses miralax every day.   Urination: Has an overactive bladder.    Dizziness: No   Hallucinations: No    Excessive daytime somnolence: No   Mood: No problems   Anxiety: No  Balance: She only walks with physical therapy.     7/21/21: I counseled her to increase her sinemet dose to 2 tabs every 3 hours with concurrent use of amantadine to reduce dyskinesia. Dyskinesia is minimal on amantadine currently. Increasing sinemet from 1 to 2 pills has resulted in mild improvement in freezing. She freezes after she eats a meal. She is not able to ambulate per baseline but when sinemet is working well, she has more spontaneous movement in the chair.     Past medical history:   Past Medical History:   Diagnosis Date   • Cancer (HCC)     melanoma on back, surgically removed in 1981. Basal cell on scalp removed 2010   • Kidney stone 9/4/2015   • Muscle disorder     Parkinsons   • Parkinson's disease (HCC)        Past surgical history:   Past Surgical History:   Procedure Laterality Date   • CYSTOSCOPY Right 9/5/2015    Procedure: CYSTOSCOPY;  Surgeon: Smith Bennett M.D.;  Location: SURGERY [facility];  Service:    • STENT PLACEMENT  9/5/2015    Procedure: STENT PLACEMENT;  Surgeon: Smith Bennett M.D.;  Location: SURGERY  [facility];  Service:    • URETEROSCOPY Right 2015    Procedure: URETEROSCOPY;  Surgeon: Smith Bennett M.D.;  Location: SURGERY [facility];  Service:    • APPENDECTOMY     • KNEE REPLACEMENT, TOTAL         Family history:   Family History   Problem Relation Age of Onset   • Heart Disease Mother         Heart attack/   • Hypertension Mother    • Hyperlipidemia Sister    • Heart Disease Brother    • Cancer Maternal Aunt         stomach cancer/   • Hypertension Maternal Aunt    • Heart Disease Maternal Grandmother    • Cancer Maternal Grandfather         throat cancer   • Hypertension Maternal Uncle        Social history:   Social History     Socioeconomic History   • Marital status:      Spouse name: Not on file   • Number of children: Not on file   • Years of education: Not on file   • Highest education level: Not on file   Occupational History   • Not on file   Tobacco Use   • Smoking status: Never Smoker   • Smokeless tobacco: Never Used   Vaping Use   • Vaping Use: Never used   Substance and Sexual Activity   • Alcohol use: No     Alcohol/week: 0.0 oz   • Drug use: No   • Sexual activity: Never     Birth control/protection: Abstinence   Other Topics Concern   • Not on file   Social History Narrative   • Not on file     Social Determinants of Health     Financial Resource Strain:    • Difficulty of Paying Living Expenses:    Food Insecurity:    • Worried About Running Out of Food in the Last Year:    • Ran Out of Food in the Last Year:    Transportation Needs:    • Lack of Transportation (Medical):    • Lack of Transportation (Non-Medical):    Physical Activity:    • Days of Exercise per Week:    • Minutes of Exercise per Session:    Stress:    • Feeling of Stress :    Social Connections:    • Frequency of Communication with Friends and Family:    • Frequency of Social Gatherings with Friends and Family:    • Attends Mu-ism Services:    • Active Member of Clubs or  "Organizations:    • Attends Club or Organization Meetings:    • Marital Status:    Intimate Partner Violence:    • Fear of Current or Ex-Partner:    • Emotionally Abused:    • Physically Abused:    • Sexually Abused:        Current medications:   Current Outpatient Medications   Medication   • entacapone (COMTAN) 200 MG Tab   • ROPINIRole (REQUIP) 2 MG tablet   • carbidopa-levodopa (SINEMET)  MG Tab   • magnesium hydroxide (MILK OF MAGNESIA) 400 MG/5ML Suspension   • HYDROcodone-acetaminophen (NORCO) 7.5-325 MG per tablet   • calcium carbonate (TUMS) 500 MG Chew Tab   • amantadine (SYMMETREL) 100 MG Tab   • tolterodine (DETROL) 1 MG Tab   • selegiline (ELDEPRYL) 5 MG capsule   • vitamin D (CHOLECALCIFEROL) 1000 UNIT Tab   • predniSONE (DELTASONE) 10 MG Tab   • coenzyme Q-10 30 MG capsule   • TURMERIC PO     No current facility-administered medications for this visit.       Medication Allergy:  No Known Allergies    Physical examination:   Vitals:    07/22/21 0928 07/22/21 0935   BP: 118/68 (!) 98/60   BP Location: Left arm Left arm   Patient Position: Sitting Standing   BP Cuff Size: Adult Adult   Pulse: 87 87   Resp: 16    Temp: 36.6 °C (97.8 °F)    TempSrc: Temporal    SpO2: 95% 95%   Weight: 49.9 kg (110 lb)    Height: 1.727 m (5' 8\")      Neurological Exam    Motor   The following abnormal movements were seen: Constant bilateral upper and lower extremity rest tremor..    Gait  Casual gait:  She is unable to stand up without assistance.   She is in a wheelchair. .       Labs:  I reviewed the following labs personally:  None    Imaging:   None     ASSESSMENT AND PLAN:  Problem List Items Addressed This Visit     Parkinson's disease (HCC)    Relevant Medications    entacapone (COMTAN) 200 MG Tab    ROPINIRole (REQUIP) 2 MG tablet    Primary freezing of gait    Debility          1. Parkinson's disease (HCC)  - entacapone (COMTAN) 200 MG Tab; Take 1 tablet by mouth 5 Times a Day.  Dispense: 450 tablet; Refill: " 2  - ROPINIRole (REQUIP) 2 MG tablet; Take 1 tablet by mouth 2 times a day. Take at 5:30AM and 5:30PM  Dispense: 60 tablet; Refill: 6    2. Debility    3. Primary freezing of gait    81 y/o with advanced PD who is wheelchair bound and unable to ambulate without assistance. She has motor fluctuations where her entire body is immobile when her sinemet wears off. The dyskinesia is minimal after I started amantadine therefore I have prescribed entacapone with each dose of sinemet to reduce fluctuations and provide more ON time. I have counseled the daughter that the patient will not have an improvement in her gait with higher doses of L-DOPA and this is for reducing bradykinesia in the stationary position.     FOLLOW-UP:   Return in about 2 months (around 9/22/2021).    Total time spent for the day for this patient unrelated to procedure time is: 25 minutes. I spent 21 minutes in face to face time and I spent 1 minutes pre-charting and 3 minutes in post-visit documentation.      Dr. Santiago Medina D.O.  [facility]   Movement Disorders Specialist

## 2021-07-22 ENCOUNTER — OFFICE VISIT (OUTPATIENT)
Dept: NEUROLOGY | Facility: MEDICAL CENTER | Age: 81
End: 2021-07-22
Attending: PSYCHIATRY & NEUROLOGY
Payer: MEDICARE

## 2021-07-22 VITALS
RESPIRATION RATE: 16 BRPM | HEIGHT: 68 IN | DIASTOLIC BLOOD PRESSURE: 60 MMHG | WEIGHT: 110 LBS | TEMPERATURE: 97.8 F | OXYGEN SATURATION: 95 % | HEART RATE: 87 BPM | SYSTOLIC BLOOD PRESSURE: 98 MMHG | BODY MASS INDEX: 16.67 KG/M2

## 2021-07-22 DIAGNOSIS — R53.81 DEBILITY: ICD-10-CM

## 2021-07-22 DIAGNOSIS — G20.A1 PARKINSON'S DISEASE (HCC): ICD-10-CM

## 2021-07-22 DIAGNOSIS — R26.89 PRIMARY FREEZING OF GAIT: ICD-10-CM

## 2021-07-22 PROCEDURE — 99214 OFFICE O/P EST MOD 30 MIN: CPT | Performed by: PSYCHIATRY & NEUROLOGY

## 2021-07-22 PROCEDURE — 99212 OFFICE O/P EST SF 10 MIN: CPT | Performed by: PSYCHIATRY & NEUROLOGY

## 2021-07-22 RX ORDER — ROPINIROLE 2 MG/1
2 TABLET, FILM COATED ORAL 2 TIMES DAILY
Qty: 60 TABLET | Refills: 6 | Status: SHIPPED | OUTPATIENT
Start: 2021-07-22 | End: 2021-12-14 | Stop reason: SDUPTHER

## 2021-07-22 RX ORDER — ENTACAPONE 200 MG/1
200 TABLET ORAL
Qty: 450 TABLET | Refills: 2 | Status: SHIPPED
Start: 2021-07-22 | End: 2021-08-11 | Stop reason: SINTOL

## 2021-07-22 NOTE — PATIENT INSTRUCTIONS
Start entacapone.   Take 1 tab of entacapone with each 2 pills of carbidopa/levodopa. This will be 1 tab at 5:30am, 8:30am, 11:30am, 2:30pm, 5:30pm.

## 2021-08-05 ENCOUNTER — PATIENT MESSAGE (OUTPATIENT)
Dept: NEUROLOGY | Facility: MEDICAL CENTER | Age: 81
End: 2021-08-05

## 2021-08-05 DIAGNOSIS — G24.01 DRUG-INDUCED DYSKINESIA: ICD-10-CM

## 2021-08-05 PROBLEM — R79.82 ELEVATED C-REACTIVE PROTEIN (CRP): Status: ACTIVE | Noted: 2021-08-05

## 2021-08-05 PROBLEM — M25.50 JOINT PAIN: Status: ACTIVE | Noted: 2021-08-05

## 2021-08-05 RX ORDER — AMANTADINE HYDROCHLORIDE 100 MG/1
100 TABLET ORAL 3 TIMES DAILY
Qty: 270 TABLET | Refills: 2 | Status: SHIPPED | OUTPATIENT
Start: 2021-08-05 | End: 2021-08-06 | Stop reason: SDUPTHER

## 2021-08-06 RX ORDER — AMANTADINE HYDROCHLORIDE 100 MG/1
100 TABLET ORAL 3 TIMES DAILY
Qty: 270 TABLET | Refills: 2 | Status: SHIPPED | OUTPATIENT
Start: 2021-08-06 | End: 2021-08-25

## 2021-08-11 ENCOUNTER — PATIENT MESSAGE (OUTPATIENT)
Dept: NEUROLOGY | Facility: MEDICAL CENTER | Age: 81
End: 2021-08-11

## 2021-08-11 NOTE — PROGRESS NOTES
I have discontinued entacapone due to severe dyskinesia. This was being given 3x/day. No changes were made to her other PD meds.

## 2021-08-20 ENCOUNTER — PATIENT MESSAGE (OUTPATIENT)
Dept: NEUROLOGY | Facility: MEDICAL CENTER | Age: 81
End: 2021-08-20

## 2021-08-20 DIAGNOSIS — G20.A1 PARKINSON'S DISEASE (HCC): ICD-10-CM

## 2021-08-22 DIAGNOSIS — G24.01 DRUG-INDUCED DYSKINESIA: ICD-10-CM

## 2021-08-25 RX ORDER — AMANTADINE HYDROCHLORIDE 100 MG/1
100 CAPSULE, GELATIN COATED ORAL 3 TIMES DAILY
Qty: 270 CAPSULE | Refills: 2 | Status: SHIPPED | OUTPATIENT
Start: 2021-08-25 | End: 2021-10-14

## 2021-09-03 PROBLEM — H61.20 CERUMEN IN AUDITORY CANAL ON EXAMINATION: Status: ACTIVE | Noted: 2021-09-03

## 2021-09-03 PROBLEM — R00.0 TACHYCARDIA: Status: ACTIVE | Noted: 2021-09-03

## 2021-09-22 ENCOUNTER — TELEMEDICINE (OUTPATIENT)
Dept: NEUROLOGY | Facility: MEDICAL CENTER | Age: 81
End: 2021-09-22
Attending: PSYCHIATRY & NEUROLOGY
Payer: MEDICARE

## 2021-09-22 ENCOUNTER — TELEPHONE (OUTPATIENT)
Dept: NEUROLOGY | Facility: MEDICAL CENTER | Age: 81
End: 2021-09-22

## 2021-09-22 VITALS — HEIGHT: 68 IN | BODY MASS INDEX: 16.67 KG/M2 | WEIGHT: 110 LBS

## 2021-09-22 DIAGNOSIS — G20.A1 PARKINSON'S DISEASE (HCC): ICD-10-CM

## 2021-09-22 DIAGNOSIS — G24.01 DRUG-INDUCED DYSKINESIA: ICD-10-CM

## 2021-09-22 PROCEDURE — 99214 OFFICE O/P EST MOD 30 MIN: CPT | Mod: 95 | Performed by: PSYCHIATRY & NEUROLOGY

## 2021-09-22 NOTE — TELEPHONE ENCOUNTER
Called Liliya to schedule [facility] appt with Dr.Way heide Farris. No answer. I will try to follow-up with them tomorrow.

## 2021-09-22 NOTE — PROGRESS NOTES
Telemedicine: Established Patient   This evaluation was conducted via Zoom using secure and encrypted videoconferencing technology. The patient was in a private location in the state of [place].    The patient's identity was confirmed and verbal consent was obtained for this virtual visit.    Subjective:   CC:   Chief Complaint   Patient presents with   • Follow-Up     Parkinsons       Kaleigh Tucker is a 80 y.o. female presenting for evaluation and management of:    Parkinson's disease since 2005 with L-DOPA responsive freezing of gait. Her ON state has been complicated by peak dose dyskinesia.     PD Meds:  Carb/levo 25/100 2/1.5/1.5/1.5/1.5   Ropinirole 2mg twice daily   Selegiline 5mg twice daily   Amantadine 100mg 3x/day      Movement-Specific ROS  Sleep: Sleeps straight through the night.    Swallowing: Mild trouble swallowing   Constipation: Has been having constipation since she started norco.    Urination: Has an overactive bladder.    Dizziness: No   Hallucinations: No    Excessive daytime somnolence: No   Mood: No problems   Anxiety: No  Pain: Has had generalized whole body pain that has resolved with opiates.   Balance: She only walks with physical therapy.      7/21/21: I counseled her to increase her sinemet dose to 2 tabs every 3 hours with concurrent use of amantadine to reduce dyskinesia. Dyskinesia is minimal on amantadine currently. Increasing sinemet from 1 to 2 pills has resulted in mild improvement in freezing. She freezes after she eats a meal. She is not able to ambulate per baseline but when sinemet is working well, she has more spontaneous movement in the chair.     9/22/21:  Since the last visit, her L-DOPA regimen was adjusted to 2/1.5/1.5/1.5/1.5 with amantadine 100mg 3x/day to treat dyskinesia. Dyskinesia is now well controlled.   After her 2:30pm dose she has tremors that last until 5pm.     No Known Allergies    Current medicines (including changes today)  Current Outpatient Medications    Medication Sig Dispense Refill   • carbidopa-levodopa (SINEMET)  MG Tab Take 2 Tablets by mouth every morning AND 1.5 Tablets 4 times a day. 720 Tablet 2   • HYDROcodone/acetaminophen (NORCO)  MG Tab Take 1 Tablet by mouth 3 times a day for 30 days. 90 Tablet 0   • amantadine (SYMMETREL) 100 MG Cap Take 1 Capsule by mouth 3 times a day. 270 Capsule 2   • ROPINIRole (REQUIP) 2 MG tablet Take 1 tablet by mouth 2 times a day. Take at 5:30AM and 5:30PM 60 tablet 6   • calcium carbonate (TUMS) 500 MG Chew Tab Chew 1,000 mg 2 times a day.     • tolterodine (DETROL) 1 MG Tab Take 1 mg by mouth every day.     • selegiline (ELDEPRYL) 5 MG capsule TAKE 1 CAPSULE BY MOUTH TWICE A  Cap 1   • vitamin D (CHOLECALCIFEROL) 1000 UNIT Tab Take 1,000 Units by mouth every day.     • carbamide peroxide (DEBROX) 6.5 % Solution Administer 5 Drops into affected ear(s) 2 times a day. Administer drops in both ears. (Patient not taking: Reported on 9/22/2021) 15 mL 0   • predniSONE (DELTASONE) 10 MG Tab 4 tab PO Qday x 3d, then 3 tab PO Qday x 3d, then 2 tab PO Qday x 3d, then 1 tab PO Qday x 3d (Patient not taking: Reported on 7/22/2021) 30 tablet 0   • coenzyme Q-10 30 MG capsule Take 60 mg by mouth every day. (Patient not taking: Reported on 7/22/2021)     • TURMERIC PO Take  by mouth. (Patient not taking: Reported on 6/9/2021)       No current facility-administered medications for this visit.       Patient Active Problem List    Diagnosis Date Noted   • Drug-induced dyskinesia 09/22/2021   • Cerumen in auditory canal on examination 09/03/2021   • Tachycardia 09/03/2021   • Elevated C-reactive protein (CRP) 08/05/2021   • Joint pain 08/05/2021   • Debility 06/20/2021   • Osteoarthritis 06/20/2021   • Uncomplicated opioid dependence (HCC) 06/20/2021   • Neuropathy 06/20/2021   • History of melanoma 06/20/2021   • Chronic pain 06/20/2021   • Primary freezing of gait 06/09/2021   • Cellulitis of left lower extremity  "2018   • Knee pain 2017   • Insomnia due to medical condition 2017   • Burning pain 2016   • Subcutaneous cyst 2016   • Parkinson's disease (HCC) 2015   • Hyponatremia 2015   • Kidney stone 2015       Family History   Problem Relation Age of Onset   • Heart Disease Mother         Heart attack/   • Hypertension Mother    • Hyperlipidemia Sister    • Heart Disease Brother    • Cancer Maternal Aunt         stomach cancer/   • Hypertension Maternal Aunt    • Heart Disease Maternal Grandmother    • Cancer Maternal Grandfather         throat cancer   • Hypertension Maternal Uncle        She  has a past medical history of Cancer (HCC), Kidney stone (2015), Muscle disorder, and Parkinson's disease (HCC). She also has no past medical history of Headache(784.0).  She  has a past surgical history that includes appendectomy (); cystoscopy (Right, 2015); stent placement (2015); ureteroscopy (Right, 2015); and knee replacement, total.       Objective:   Ht 1.727 m (5' 8\") Comment: Pt states  Wt 49.9 kg (110 lb) Comment: Pt states  BMI 16.73 kg/m²     Mild dyskinesia while seated.   Patient is not ambulatory.     Assessment and Plan:   The following treatment plan was discussed:     1. Parkinson's disease (HCC)    2. Drug-induced dyskinesia    Due to bothersome tremor from 2:30-5:30pm, I have increased her 2:30pm dose to 2 tabs, but will keep the remainder of her doses the same.   She will have the following L-DOPA schedule:   Carb/levo 25/100 every 3 hours starting at 5:30am while awake: 2/1.5/1.5/2/1.5   Dyskinesia is no longer as issue.   She is not ambulatory and the sinemet provides increased mobility in her wheelchair.     Follow-up: Return in about 4 months (around 2022).           "

## 2021-09-22 NOTE — TELEPHONE ENCOUNTER
Tried to call pt twice to start virtual visit with . No answer. I left her a VM with my call back number. Will try to call her again as her appt gets closer.

## 2021-09-24 ENCOUNTER — TELEPHONE (OUTPATIENT)
Dept: NEUROLOGY | Facility: MEDICAL CENTER | Age: 81
End: 2021-09-24

## 2021-09-24 NOTE — TELEPHONE ENCOUNTER
Called pt's daughter, Liliya, to schedule Kaleigh's [facility] with  in late January. Pt agreed to 1/25/22 at 10am appt. Left note to call Liliya or Cali for the appt.

## 2021-10-13 ENCOUNTER — PATIENT MESSAGE (OUTPATIENT)
Dept: NEUROLOGY | Facility: MEDICAL CENTER | Age: 81
End: 2021-10-13

## 2021-10-13 DIAGNOSIS — G24.01 DRUG-INDUCED DYSKINESIA: ICD-10-CM

## 2021-10-13 DIAGNOSIS — G20.A1 PARKINSON'S DISEASE (HCC): ICD-10-CM

## 2021-10-15 ENCOUNTER — PATIENT MESSAGE (OUTPATIENT)
Dept: NEUROLOGY | Facility: MEDICAL CENTER | Age: 81
End: 2021-10-15

## 2021-10-15 DIAGNOSIS — G24.01 DRUG-INDUCED DYSKINESIA: ICD-10-CM

## 2021-10-15 DIAGNOSIS — G20.A1 PARKINSON'S DISEASE (HCC): ICD-10-CM

## 2021-10-15 RX ORDER — AMANTADINE HYDROCHLORIDE 100 MG/1
200 CAPSULE, GELATIN COATED ORAL 2 TIMES DAILY
Qty: 360 CAPSULE | Refills: 2 | Status: SHIPPED | OUTPATIENT
Start: 2021-10-15 | End: 2021-10-27 | Stop reason: SDUPTHER

## 2021-10-21 ENCOUNTER — PATIENT MESSAGE (OUTPATIENT)
Dept: NEUROLOGY | Facility: MEDICAL CENTER | Age: 81
End: 2021-10-21

## 2021-10-21 DIAGNOSIS — G20.A1 PARKINSON'S DISEASE (HCC): ICD-10-CM

## 2021-10-21 DIAGNOSIS — G24.01 DRUG-INDUCED DYSKINESIA: ICD-10-CM

## 2021-10-27 RX ORDER — AMANTADINE HYDROCHLORIDE 100 MG/1
100 CAPSULE, GELATIN COATED ORAL 2 TIMES DAILY
Qty: 180 CAPSULE | Refills: 2 | Status: SHIPPED | OUTPATIENT
Start: 2021-10-27 | End: 2022-01-20

## 2021-12-13 ENCOUNTER — PATIENT MESSAGE (OUTPATIENT)
Dept: NEUROLOGY | Facility: MEDICAL CENTER | Age: 81
End: 2021-12-13

## 2021-12-13 DIAGNOSIS — G24.01 DRUG-INDUCED DYSKINESIA: ICD-10-CM

## 2021-12-13 DIAGNOSIS — G20.A1 PARKINSON'S DISEASE (HCC): ICD-10-CM

## 2021-12-14 RX ORDER — ROPINIROLE 2 MG/1
2 TABLET, FILM COATED ORAL 2 TIMES DAILY
Qty: 180 TABLET | Refills: 2 | Status: SHIPPED | OUTPATIENT
Start: 2021-12-14 | End: 2021-12-20 | Stop reason: SDUPTHER

## 2022-01-13 ENCOUNTER — PATIENT MESSAGE (OUTPATIENT)
Dept: NEUROLOGY | Facility: MEDICAL CENTER | Age: 82
End: 2022-01-13

## 2022-01-13 DIAGNOSIS — G20.A1 PARKINSON'S DISEASE (HCC): ICD-10-CM

## 2022-01-13 RX ORDER — ROPINIROLE 2 MG/1
2 TABLET, FILM COATED ORAL 2 TIMES DAILY
Qty: 180 TABLET | Refills: 2 | Status: SHIPPED | OUTPATIENT
Start: 2022-01-13 | End: 2022-10-10

## 2022-01-25 ENCOUNTER — TELEMEDICINE (OUTPATIENT)
Dept: NEUROLOGY | Facility: MEDICAL CENTER | Age: 82
End: 2022-01-25
Attending: PSYCHIATRY & NEUROLOGY
Payer: MEDICARE

## 2022-01-25 VITALS — HEIGHT: 68 IN | WEIGHT: 105 LBS | BODY MASS INDEX: 15.91 KG/M2

## 2022-01-25 DIAGNOSIS — G47.00 INSOMNIA, UNSPECIFIED TYPE: ICD-10-CM

## 2022-01-25 DIAGNOSIS — G20.A1 PARKINSON'S DISEASE (HCC): ICD-10-CM

## 2022-01-25 PROCEDURE — 99215 OFFICE O/P EST HI 40 MIN: CPT | Mod: 95 | Performed by: PSYCHIATRY & NEUROLOGY

## 2022-01-25 RX ORDER — TRAZODONE HYDROCHLORIDE 50 MG/1
50 TABLET ORAL NIGHTLY
Qty: 30 TABLET | Refills: 2 | Status: SHIPPED | OUTPATIENT
Start: 2022-01-25 | End: 2022-02-17

## 2022-01-25 ASSESSMENT — PATIENT HEALTH QUESTIONNAIRE - PHQ9: CLINICAL INTERPRETATION OF PHQ2 SCORE: 0

## 2022-01-25 NOTE — PROGRESS NOTES
Telemedicine: Established Patient   This evaluation was conducted via Zoom using secure and encrypted videoconferencing technology. The patient was in a private location in the state of [place].    The patient's identity was confirmed and verbal consent was obtained for this virtual visit.    Subjective:   CC:   Chief Complaint   Patient presents with   • Follow-Up     Parkinson disease       Kaleigh Tucker is a 81 y.o. female presenting for evaluation and management of:    Parkinson's disease since 2005 with L-DOPA responsive freezing of gait. Her ON state has been complicated by peak dose dyskinesia.     PD Meds:  Carb/levo 25/100 2/1.5/1.5/1.5/1.5   Ropinirole 2mg twice daily      Movement-Specific ROS  Sleep: Sleeps straight through the night.    Swallowing: Mild trouble swallowing   Constipation: Has been having constipation since she started norco.    Urination: Has an overactive bladder.    Dizziness: No   Hallucinations: No    Excessive daytime somnolence: No   Mood: No problems   Anxiety: No  Pain: Has had generalized whole body pain that has resolved with opiates.   Balance: She only walks with physical therapy.      7/21/21: I counseled her to increase her sinemet dose to 2 tabs every 3 hours with concurrent use of amantadine to reduce dyskinesia. Dyskinesia is minimal on amantadine currently. Increasing sinemet from 1 to 2 pills has resulted in mild improvement in freezing. She freezes after she eats a meal. She is not able to ambulate per baseline but when sinemet is working well, she has more spontaneous movement in the chair.      9/22/21:  Since the last visit, her L-DOPA regimen was adjusted to 2/1.5/1.5/1.5/1.5 with amantadine 100mg 3x/day to treat dyskinesia. Dyskinesia is now well controlled.   After her 2:30pm dose she has tremors that last until 5pm.     1/25/21: She is no longer taking amantadine due to hallucinations. Her 2:30pm has not kicked in the majority of the days the last 2 weeks. She is  completely frozen when this happens. All of the other doses work. She starts to freeze at 2pm and lasts until her 5:30pm dose kicks in.   The pills kick in after 15-30 minutes.   She wakes up in the middle of the night and has trouble returning to sleep. She takes melatonin every night.   She was prescribed lorazepam by her primary doctor for anxiety.     No Known Allergies    Current medicines (including changes today)  Current Outpatient Medications   Medication Sig Dispense Refill   • traZODone (DESYREL) 50 MG Tab Take 1 Tablet by mouth every evening. 30 Tablet 2   • carbidopa-levodopa (SINEMET)  MG Tab Take 1 Tablet by mouth 1 time a day as needed. For OFF periods 30 Tablet 2   • carbidopa-levodopa (SINEMET)  MG Tab Take 2 tablets 5:30am, 1.5 tab 8:30am, 1.5 tab 11:30 am, 2 tabs 2:00pm, 1.5 tabs 5:30pm 765 Tablet 2   • HYDROcodone-acetaminophen (NORCO) 5-325 MG Tab per tablet hydrocodone 5 mg-acetaminophen 325 mg tablet     • triamcinolone acetonide (KENALOG) 0.1 % Cream Apply 1 Application topically 2 times a day as needed (rash) for up to 180 days. 15 g 2   • ROPINIRole (REQUIP) 2 MG tablet Take 1 Tablet by mouth 2 times a day for 270 days. Take at 5:30AM and 5:30PM 180 Tablet 2   • gabapentin (NEURONTIN) 400 MG Cap Take 1 Capsule by mouth 3 times a day. 270 Capsule 3   • calcium carbonate (TUMS) 500 MG Chew Tab Chew 1,000 mg 2 times a day.     • vitamin D (CHOLECALCIFEROL) 1000 UNIT Tab Take 1,000 Units by mouth every day.     • coenzyme Q-10 30 MG capsule Take 60 mg by mouth every day. (Patient not taking: Reported on 7/22/2021)     • TURMERIC PO Take  by mouth. (Patient not taking: Reported on 6/9/2021)       No current facility-administered medications for this visit.       Patient Active Problem List    Diagnosis Date Noted   • Drug-induced dyskinesia 09/22/2021   • Cerumen in auditory canal on examination 09/03/2021   • Tachycardia 09/03/2021   • Elevated C-reactive protein (CRP) 08/05/2021  "  • Joint pain 2021   • Debility 2021   • Osteoarthritis 2021   • Uncomplicated opioid dependence (HCC) 2021   • Neuropathy 2021   • History of melanoma 2021   • Chronic pain 2021   • Primary freezing of gait 2021   • Cellulitis of left lower extremity 2018   • Knee pain 2017   • Insomnia due to medical condition 2017   • Burning pain 2016   • Subcutaneous cyst 2016   • Parkinson's disease (HCC) 2015   • Hyponatremia 2015   • Kidney stone 2015       Family History   Problem Relation Age of Onset   • Heart Disease Mother         Heart attack/   • Hypertension Mother    • Hyperlipidemia Sister    • Heart Disease Brother    • Cancer Maternal Aunt         stomach cancer/   • Hypertension Maternal Aunt    • Heart Disease Maternal Grandmother    • Cancer Maternal Grandfather         throat cancer   • Hypertension Maternal Uncle        She  has a past medical history of Cancer (HCC), Kidney stone (2015), Muscle disorder, and Parkinson's disease ([facility]). She also has no past medical history of Headache(784.0).  She  has a past surgical history that includes appendectomy (); cystoscopy (Right, 2015); stent placement (2015); ureteroscopy (Right, 2015); and knee replacement, total.       Objective:   Ht 1.727 m (5' 8\")   Wt 47.6 kg (105 lb)   BMI 15.97 kg/m²     Examination was not performed.    Assessment and Plan:   The following treatment plan was discussed:     1. Parkinson's disease (HCC)  - carbidopa-levodopa (SINEMET)  MG Tab; Take 1 Tablet by mouth 1 time a day as needed. For OFF periods  Dispense: 30 Tablet; Refill: 2  - carbidopa-levodopa (SINEMET)  MG Tab; Take 2 tablets 5:30am, 1.5 tab 8:30am, 1.5 tab 11:30 am, 2 tabs 2:00pm, 1.5 tabs 5:30pm  Dispense: 765 Tablet; Refill: 2    2. Insomnia, unspecified type  - traZODone (DESYREL) 50 MG Tab; Take 1 Tablet by mouth every " evening.  Dispense: 30 Tablet; Refill: 2    81-year-old female with Parkinson's disease, residing in an assisted living facility.  Her main issue today is return of bradykinesia and freezing at 2 PM.  She believes that this is secondary to her lunchtime meal which is usually from 1130 to 12:30 PM.  Regardless, she is frozen from 2 PM to 5:30 PM with her current drug regimen.  I have pushed the timing of the 2:30 PM dosage to 2 PM and have increased the dose to 2 tablets to provide a more reliable effect I have also provided as needed rescue doses of Sinemet as a separate prescription.  For treatment of insomnia, I have prescribed trazodone 50 mg every night.  She wakes up in the middle of the night and is unable to return to sleep.    Follow-up: Return in about 3 months (around 4/25/2022) for 40 min virtual follow-up .         Total time spent for the day for this patient unrelated to procedure time is: 40 minutes. I spent 29 minutes in face to face time and I spent 6 minutes pre-charting and 5 minutes in post-visit documentation.

## 2022-01-25 NOTE — PATIENT INSTRUCTIONS
Change the timing of the 2:30pm dosage to 2pm and increase the dosage to 2 tabs from 1.5 tabs.     Start trazodone 50mg 1 hour prior to bedtime.

## 2022-02-17 DIAGNOSIS — G47.00 INSOMNIA, UNSPECIFIED TYPE: ICD-10-CM

## 2022-02-17 DIAGNOSIS — G20.A1 PARKINSON'S DISEASE (HCC): ICD-10-CM

## 2022-02-17 RX ORDER — TRAZODONE HYDROCHLORIDE 50 MG/1
TABLET ORAL
Qty: 30 TABLET | Refills: 2 | Status: SHIPPED | OUTPATIENT
Start: 2022-02-17 | End: 2022-02-18 | Stop reason: SDUPTHER

## 2022-02-18 ENCOUNTER — PATIENT MESSAGE (OUTPATIENT)
Dept: NEUROLOGY | Facility: MEDICAL CENTER | Age: 82
End: 2022-02-18
Payer: MEDICARE

## 2022-02-18 DIAGNOSIS — G47.00 INSOMNIA, UNSPECIFIED TYPE: ICD-10-CM

## 2022-02-18 RX ORDER — TRAZODONE HYDROCHLORIDE 100 MG/1
100 TABLET ORAL EVERY EVENING
Qty: 30 TABLET | Refills: 2 | Status: SHIPPED | OUTPATIENT
Start: 2022-02-18 | End: 2022-03-02 | Stop reason: SDUPTHER

## 2022-07-19 ENCOUNTER — APPOINTMENT (OUTPATIENT)
Dept: NEUROLOGY | Facility: MEDICAL CENTER | Age: 82
End: 2022-07-19
Payer: MEDICARE

## 2022-11-07 ENCOUNTER — PATIENT MESSAGE (OUTPATIENT)
Dept: HEALTH INFORMATION MANAGEMENT | Facility: OTHER | Age: 82
End: 2022-11-07

## 2022-11-11 DIAGNOSIS — G20.A1 PARKINSON'S DISEASE (HCC): ICD-10-CM

## 2023-01-20 ENCOUNTER — PATIENT MESSAGE (OUTPATIENT)
Dept: NEUROLOGY | Facility: MEDICAL CENTER | Age: 83
End: 2023-01-20
Payer: MEDICARE

## 2023-01-20 DIAGNOSIS — G20.A1 PARKINSON'S DISEASE (HCC): ICD-10-CM

## 2023-11-29 ENCOUNTER — PATIENT MESSAGE (OUTPATIENT)
Dept: HEALTH INFORMATION MANAGEMENT | Facility: OTHER | Age: 83
End: 2023-11-29

## 2025-06-30 NOTE — TELEPHONE ENCOUNTER
----- Message from Jamil Gunn, Med Ass't sent at 7/12/2019  8:22 AM PDT -----  Regarding: FW: Prescription Question  Contact: [phone]      ----- Message -----  From: Kaleigh Tucker  Sent: 7/10/2019   3:08 PM  To: [facility]  Subject: Prescription Question                            I have enough carb-Rudy through Tuesday and I cannot renew the prescription until the 21st. The problem seems to be that I am taking 2 pills more than the prescription says.     The prescription says  2 at 5':30am, 8:30am,11:30am,and 1 at 2:30pm.    I am taking as above and an additional 1 at 2:30pm, and 1 at 5:00 pm.    That explains why I am running out before it's time to refill it.    Can you take care of this for me?    I sent a message this morning that the 4mg ER ropinerole is not working. Because the copay is $131 for a month's supply, I would prefer to go back to the 2mg two times a day.    Is this okay with Dr Landin?    Thank you   Kaleigh Tucker   Physical Therapy   Date: 6/30/2025  Patient canceled late (per policy guidelines) today's (6/30/2025) scheduled appointment.  This is the patient's first no show/late cancel.       spoke with patient about missing today's appointment, reminder of next scheduled appointment. Attendance guidelines were reviewed.